# Patient Record
Sex: FEMALE | Race: WHITE | Employment: FULL TIME | URBAN - NONMETROPOLITAN AREA
[De-identification: names, ages, dates, MRNs, and addresses within clinical notes are randomized per-mention and may not be internally consistent; named-entity substitution may affect disease eponyms.]

---

## 2017-07-25 ENCOUNTER — HOSPITAL ENCOUNTER (OUTPATIENT)
Dept: GENERAL RADIOLOGY | Age: 47
Discharge: HOME | End: 2017-07-25
Payer: COMMERCIAL

## 2017-07-25 ENCOUNTER — HOSPITAL ENCOUNTER (OUTPATIENT)
Age: 47
Discharge: HOME | End: 2017-07-25
Payer: COMMERCIAL

## 2017-07-25 DIAGNOSIS — R05.9 COUGH: ICD-10-CM

## 2017-07-25 PROCEDURE — 71020 XR CHEST STANDARD TWO VW: CPT

## 2017-08-03 ENCOUNTER — HOSPITAL ENCOUNTER (OUTPATIENT)
Dept: GENERAL RADIOLOGY | Age: 47
Discharge: HOME | End: 2017-08-03
Payer: COMMERCIAL

## 2017-08-03 DIAGNOSIS — R13.10 DYSPHAGIA, UNSPECIFIED TYPE: ICD-10-CM

## 2017-08-03 PROCEDURE — 74230 X-RAY XM SWLNG FUNCJ C+: CPT

## 2017-08-03 PROCEDURE — 92611 MOTION FLUOROSCOPY/SWALLOW: CPT | Performed by: SPEECH-LANGUAGE PATHOLOGIST

## 2017-08-03 PROCEDURE — 2500000003 HC RX 250 WO HCPCS: Performed by: INTERNAL MEDICINE

## 2017-08-03 RX ADMIN — BARIUM SULFATE 60 ML: 0.81 POWDER, FOR SUSPENSION ORAL at 10:29

## 2017-08-03 RX ADMIN — BARIUM SULFATE 20 ML: 400 PASTE ORAL at 10:35

## 2018-04-13 ENCOUNTER — HOSPITAL ENCOUNTER (OUTPATIENT)
Age: 48
Discharge: HOME | End: 2018-04-13
Payer: COMMERCIAL

## 2018-04-13 LAB
BASOPHILS # BLD: 0.4 %
BASOPHILS ABSOLUTE: 0 THOU/MM3 (ref 0–0.1)
CALCIUM SERPL-MCNC: 9.7 MG/DL (ref 8.5–10.5)
EOSINOPHIL # BLD: 2.6 %
EOSINOPHILS ABSOLUTE: 0.2 THOU/MM3 (ref 0–0.4)
HCT VFR BLD CALC: 42.1 % (ref 37–47)
HEMOGLOBIN: 13.8 GM/DL (ref 12–16)
IRON: 120 UG/DL (ref 50–170)
LYMPHOCYTES # BLD: 31 %
LYMPHOCYTES ABSOLUTE: 2.2 THOU/MM3 (ref 1–4.8)
MAGNESIUM: 2.1 MG/DL (ref 1.6–2.4)
MCH RBC QN AUTO: 30.5 PG (ref 27–31)
MCHC RBC AUTO-ENTMCNC: 32.8 GM/DL (ref 33–37)
MCV RBC AUTO: 93.2 FL (ref 81–99)
MONOCYTES # BLD: 6.4 %
MONOCYTES ABSOLUTE: 0.5 THOU/MM3 (ref 0.4–1.3)
NUCLEATED RED BLOOD CELLS: 0 /100 WBC
PDW BLD-RTO: 13.8 % (ref 11.5–14.5)
PLATELET # BLD: 378 THOU/MM3 (ref 130–400)
PMV BLD AUTO: 9.8 FL (ref 7.4–10.4)
RBC # BLD: 4.52 MILL/MM3 (ref 4.2–5.4)
SEG NEUTROPHILS: 59.6 %
SEGMENTED NEUTROPHILS ABSOLUTE COUNT: 4.3 THOU/MM3 (ref 1.8–7.7)
VITAMIN B-12: 596 PG/ML (ref 211–911)
VITAMIN D 25-HYDROXY: 37 NG/ML (ref 30–100)
WBC # BLD: 7.2 THOU/MM3 (ref 4.8–10.8)

## 2018-04-13 PROCEDURE — 85025 COMPLETE CBC W/AUTO DIFF WBC: CPT

## 2018-04-13 PROCEDURE — 82310 ASSAY OF CALCIUM: CPT

## 2018-04-13 PROCEDURE — 36415 COLL VENOUS BLD VENIPUNCTURE: CPT

## 2018-04-13 PROCEDURE — 82306 VITAMIN D 25 HYDROXY: CPT

## 2018-04-13 PROCEDURE — 83540 ASSAY OF IRON: CPT

## 2018-04-13 PROCEDURE — 82607 VITAMIN B-12: CPT

## 2018-04-13 PROCEDURE — 83735 ASSAY OF MAGNESIUM: CPT

## 2018-04-19 ENCOUNTER — HOSPITAL ENCOUNTER (OUTPATIENT)
Age: 48
End: 2018-04-19

## 2018-07-02 ENCOUNTER — HOSPITAL ENCOUNTER (OUTPATIENT)
Dept: MAMMOGRAPHY | Age: 48
Discharge: HOME | End: 2018-07-02
Payer: COMMERCIAL

## 2018-07-02 DIAGNOSIS — Z12.39 BREAST SCREENING: ICD-10-CM

## 2018-07-02 PROCEDURE — 77063 BREAST TOMOSYNTHESIS BI: CPT

## 2018-11-20 ENCOUNTER — HOSPITAL ENCOUNTER (OUTPATIENT)
Age: 48
End: 2018-11-20
Payer: COMMERCIAL

## 2018-12-06 ENCOUNTER — HOSPITAL ENCOUNTER (OUTPATIENT)
Age: 48
Discharge: HOME | End: 2018-12-06
Payer: COMMERCIAL

## 2018-12-06 LAB
ALBUMIN SERPL-MCNC: 4.2 G/DL (ref 3.5–5.1)
ALP BLD-CCNC: 66 U/L (ref 38–126)
ALT SERPL-CCNC: 21 U/L (ref 11–66)
ANION GAP SERPL CALCULATED.3IONS-SCNC: 12 MEQ/L (ref 8–16)
AST SERPL-CCNC: 23 U/L (ref 5–40)
BILIRUB SERPL-MCNC: 0.6 MG/DL (ref 0.3–1.2)
BUN BLDV-MCNC: 16 MG/DL (ref 7–22)
CALCIUM SERPL-MCNC: 9.5 MG/DL (ref 8.5–10.5)
CHLORIDE BLD-SCNC: 104 MEQ/L (ref 98–111)
CO2: 29 MEQ/L (ref 23–33)
CREAT SERPL-MCNC: 0.7 MG/DL (ref 0.4–1.2)
ERYTHROCYTE [DISTWIDTH] IN BLOOD BY AUTOMATED COUNT: 12.5 % (ref 11.5–14.5)
ERYTHROCYTE [DISTWIDTH] IN BLOOD BY AUTOMATED COUNT: 43.2 FL (ref 35–45)
FOLATE: > 20 NG/ML (ref 4.8–24.2)
GFR SERPL CREATININE-BSD FRML MDRD: 88 ML/MIN/1.73M2
GLUCOSE BLD-MCNC: 87 MG/DL (ref 70–108)
HCT VFR BLD CALC: 41.2 % (ref 37–47)
HEMOGLOBIN: 13.4 GM/DL (ref 12–16)
IRON SATURATION: 63 % (ref 20–50)
IRON: 147 UG/DL (ref 50–170)
MCH RBC QN AUTO: 30.7 PG (ref 26–33)
MCHC RBC AUTO-ENTMCNC: 32.5 GM/DL (ref 32.2–35.5)
MCV RBC AUTO: 94.5 FL (ref 81–99)
PLATELET # BLD: 324 THOU/MM3 (ref 130–400)
PMV BLD AUTO: 10.6 FL (ref 9.4–12.4)
POTASSIUM SERPL-SCNC: 3.9 MEQ/L (ref 3.5–5.2)
RBC # BLD: 4.36 MILL/MM3 (ref 4.2–5.4)
SODIUM BLD-SCNC: 145 MEQ/L (ref 135–145)
TOTAL IRON BINDING CAPACITY: 233 UG/DL (ref 171–450)
TOTAL PROTEIN: 7.3 G/DL (ref 6.1–8)
TSH SERPL DL<=0.05 MIU/L-ACNC: 1.28 UIU/ML (ref 0.4–4.2)
VITAMIN B-12: 575 PG/ML (ref 211–911)
VITAMIN D 25-HYDROXY: 36 NG/ML (ref 30–100)
WBC # BLD: 4.5 THOU/MM3 (ref 4.8–10.8)

## 2018-12-06 PROCEDURE — 36415 COLL VENOUS BLD VENIPUNCTURE: CPT

## 2018-12-06 PROCEDURE — 84630 ASSAY OF ZINC: CPT

## 2018-12-06 PROCEDURE — 84446 ASSAY OF VITAMIN E: CPT

## 2018-12-06 PROCEDURE — 82306 VITAMIN D 25 HYDROXY: CPT

## 2018-12-06 PROCEDURE — 82607 VITAMIN B-12: CPT

## 2018-12-06 PROCEDURE — 83550 IRON BINDING TEST: CPT

## 2018-12-06 PROCEDURE — 83540 ASSAY OF IRON: CPT

## 2018-12-06 PROCEDURE — 85027 COMPLETE CBC AUTOMATED: CPT

## 2018-12-06 PROCEDURE — 82746 ASSAY OF FOLIC ACID SERUM: CPT

## 2018-12-06 PROCEDURE — 84443 ASSAY THYROID STIM HORMONE: CPT

## 2018-12-06 PROCEDURE — 84425 ASSAY OF VITAMIN B-1: CPT

## 2018-12-06 PROCEDURE — 84207 ASSAY OF VITAMIN B-6: CPT

## 2018-12-06 PROCEDURE — 80053 COMPREHEN METABOLIC PANEL: CPT

## 2018-12-07 LAB — FOLATE: > 20 NG/ML (ref 4.8–24.2)

## 2018-12-09 LAB — VITAMIN E LEVEL: NORMAL

## 2018-12-10 LAB
VITAMIN B6: 102.9 NMOL/L (ref 20–125)
ZINC: 69 UG/DL (ref 60–120)

## 2018-12-11 LAB — VITAMIN B1 WHOLE BLOOD: 136 NMOL/L (ref 70–180)

## 2018-12-18 ENCOUNTER — HOSPITAL ENCOUNTER (OUTPATIENT)
Age: 48
Discharge: HOME | End: 2018-12-18
Payer: COMMERCIAL

## 2018-12-18 LAB
ANION GAP SERPL CALCULATED.3IONS-SCNC: 10 MEQ/L (ref 8–16)
BUN BLDV-MCNC: 19 MG/DL (ref 7–22)
CALCIUM SERPL-MCNC: 9.7 MG/DL (ref 8.5–10.5)
CHLORIDE BLD-SCNC: 100 MEQ/L (ref 98–111)
CO2: 31 MEQ/L (ref 23–33)
CREAT SERPL-MCNC: 0.7 MG/DL (ref 0.4–1.2)
GFR SERPL CREATININE-BSD FRML MDRD: 88 ML/MIN/1.73M2
GLUCOSE BLD-MCNC: 95 MG/DL (ref 70–108)
POTASSIUM SERPL-SCNC: 5 MEQ/L (ref 3.5–5.2)
SODIUM BLD-SCNC: 141 MEQ/L (ref 135–145)

## 2018-12-18 PROCEDURE — 80048 BASIC METABOLIC PNL TOTAL CA: CPT

## 2018-12-18 PROCEDURE — 36415 COLL VENOUS BLD VENIPUNCTURE: CPT

## 2019-02-06 ENCOUNTER — HOSPITAL ENCOUNTER (OUTPATIENT)
Age: 49
Discharge: HOME | End: 2019-02-06
Payer: COMMERCIAL

## 2019-02-06 LAB
ANION GAP SERPL CALCULATED.3IONS-SCNC: 10 MEQ/L (ref 8–16)
CHLORIDE BLD-SCNC: 103 MEQ/L (ref 98–111)
CO2: 29 MEQ/L (ref 23–33)
POTASSIUM SERPL-SCNC: 4.6 MEQ/L (ref 3.5–5.2)
SODIUM BLD-SCNC: 142 MEQ/L (ref 135–145)

## 2019-02-06 PROCEDURE — 80051 ELECTROLYTE PANEL: CPT

## 2019-02-06 PROCEDURE — 82565 ASSAY OF CREATININE: CPT

## 2019-02-06 PROCEDURE — 36415 COLL VENOUS BLD VENIPUNCTURE: CPT

## 2019-02-06 PROCEDURE — 84520 ASSAY OF UREA NITROGEN: CPT

## 2019-02-07 LAB
BUN BLDV-MCNC: 18 MG/DL (ref 7–22)
CREAT SERPL-MCNC: 0.7 MG/DL (ref 0.4–1.2)
GFR SERPL CREATININE-BSD FRML MDRD: 88 ML/MIN/1.73M2

## 2019-02-12 ENCOUNTER — OFFICE VISIT (OUTPATIENT)
Dept: NEPHROLOGY | Age: 49
End: 2019-02-12
Payer: COMMERCIAL

## 2019-02-12 VITALS
HEIGHT: 66 IN | WEIGHT: 167.6 LBS | HEART RATE: 61 BPM | BODY MASS INDEX: 26.93 KG/M2 | OXYGEN SATURATION: 99 % | DIASTOLIC BLOOD PRESSURE: 69 MMHG | SYSTOLIC BLOOD PRESSURE: 110 MMHG

## 2019-02-12 DIAGNOSIS — N18.2 CKD (CHRONIC KIDNEY DISEASE), STAGE II: ICD-10-CM

## 2019-02-12 PROCEDURE — G8419 CALC BMI OUT NRM PARAM NOF/U: HCPCS | Performed by: INTERNAL MEDICINE

## 2019-02-12 PROCEDURE — G8427 DOCREV CUR MEDS BY ELIG CLIN: HCPCS | Performed by: INTERNAL MEDICINE

## 2019-02-12 PROCEDURE — 3017F COLORECTAL CA SCREEN DOC REV: CPT | Performed by: INTERNAL MEDICINE

## 2019-02-12 PROCEDURE — 99203 OFFICE O/P NEW LOW 30 MIN: CPT | Performed by: INTERNAL MEDICINE

## 2019-02-12 PROCEDURE — 4004F PT TOBACCO SCREEN RCVD TLK: CPT | Performed by: INTERNAL MEDICINE

## 2019-02-12 PROCEDURE — G8484 FLU IMMUNIZE NO ADMIN: HCPCS | Performed by: INTERNAL MEDICINE

## 2019-02-12 RX ORDER — ESCITALOPRAM OXALATE 10 MG/1
10 TABLET ORAL DAILY
COMMUNITY
End: 2020-04-21 | Stop reason: SDUPTHER

## 2019-12-04 ENCOUNTER — OFFICE VISIT (OUTPATIENT)
Dept: FAMILY MEDICINE CLINIC | Age: 49
End: 2019-12-04
Payer: COMMERCIAL

## 2019-12-04 VITALS
HEIGHT: 67 IN | HEART RATE: 64 BPM | BODY MASS INDEX: 27.62 KG/M2 | SYSTOLIC BLOOD PRESSURE: 120 MMHG | DIASTOLIC BLOOD PRESSURE: 78 MMHG | WEIGHT: 176 LBS | OXYGEN SATURATION: 97 %

## 2019-12-04 DIAGNOSIS — F41.8 DEPRESSION WITH ANXIETY: ICD-10-CM

## 2019-12-04 DIAGNOSIS — Z98.84 HX OF GASTRIC BYPASS: ICD-10-CM

## 2019-12-04 DIAGNOSIS — Z23 NEED FOR PROPHYLACTIC VACCINATION AND INOCULATION AGAINST VARICELLA: ICD-10-CM

## 2019-12-04 DIAGNOSIS — M79.12 MYALGIA OF AUXILIARY MUSCLES, HEAD AND NECK: Primary | ICD-10-CM

## 2019-12-04 PROCEDURE — 3017F COLORECTAL CA SCREEN DOC REV: CPT | Performed by: FAMILY MEDICINE

## 2019-12-04 PROCEDURE — G8484 FLU IMMUNIZE NO ADMIN: HCPCS | Performed by: FAMILY MEDICINE

## 2019-12-04 PROCEDURE — G8427 DOCREV CUR MEDS BY ELIG CLIN: HCPCS | Performed by: FAMILY MEDICINE

## 2019-12-04 PROCEDURE — 99204 OFFICE O/P NEW MOD 45 MIN: CPT | Performed by: FAMILY MEDICINE

## 2019-12-04 PROCEDURE — 1036F TOBACCO NON-USER: CPT | Performed by: FAMILY MEDICINE

## 2019-12-04 PROCEDURE — G8419 CALC BMI OUT NRM PARAM NOF/U: HCPCS | Performed by: FAMILY MEDICINE

## 2019-12-04 RX ORDER — NAPROXEN 500 MG/1
500 TABLET ORAL 2 TIMES DAILY WITH MEALS
Qty: 180 TABLET | Refills: 1 | Status: CANCELLED | OUTPATIENT
Start: 2019-12-04

## 2019-12-04 RX ORDER — CYCLOBENZAPRINE HCL 10 MG
10 TABLET ORAL 3 TIMES DAILY PRN
Qty: 30 TABLET | Refills: 2 | Status: SHIPPED | OUTPATIENT
Start: 2019-12-04 | End: 2019-12-14

## 2019-12-04 SDOH — HEALTH STABILITY: MENTAL HEALTH: HOW MANY STANDARD DRINKS CONTAINING ALCOHOL DO YOU HAVE ON A TYPICAL DAY?: 1 OR 2

## 2019-12-04 SDOH — HEALTH STABILITY: MENTAL HEALTH: HOW OFTEN DO YOU HAVE A DRINK CONTAINING ALCOHOL?: MONTHLY OR LESS

## 2019-12-04 ASSESSMENT — PATIENT HEALTH QUESTIONNAIRE - PHQ9
1. LITTLE INTEREST OR PLEASURE IN DOING THINGS: 0
SUM OF ALL RESPONSES TO PHQ QUESTIONS 1-9: 0
SUM OF ALL RESPONSES TO PHQ9 QUESTIONS 1 & 2: 0
2. FEELING DOWN, DEPRESSED OR HOPELESS: 0
SUM OF ALL RESPONSES TO PHQ QUESTIONS 1-9: 0

## 2019-12-04 ASSESSMENT — ENCOUNTER SYMPTOMS
CHEST TIGHTNESS: 0
EYE ITCHING: 0
DIARRHEA: 0
SINUS PAIN: 0
ABDOMINAL PAIN: 0
NAUSEA: 0
VOMITING: 0
EYE PAIN: 0
SORE THROAT: 0
BLOOD IN STOOL: 0
COLOR CHANGE: 0
COUGH: 0
CONSTIPATION: 0
WHEEZING: 0
SHORTNESS OF BREATH: 0

## 2019-12-19 ENCOUNTER — HOSPITAL ENCOUNTER (OUTPATIENT)
Age: 49
Discharge: HOME | End: 2019-12-19
Payer: COMMERCIAL

## 2019-12-19 DIAGNOSIS — M79.12 MYALGIA OF AUXILIARY MUSCLES, HEAD AND NECK: ICD-10-CM

## 2019-12-19 DIAGNOSIS — Z98.84 HX OF GASTRIC BYPASS: ICD-10-CM

## 2019-12-19 LAB
FOLATE: > 20 NG/ML (ref 4.8–24.2)
VITAMIN B-12: 837 PG/ML (ref 211–911)
VITAMIN D 25-HYDROXY: 35 NG/ML (ref 30–100)

## 2019-12-19 PROCEDURE — 84425 ASSAY OF VITAMIN B-1: CPT

## 2019-12-19 PROCEDURE — 82746 ASSAY OF FOLIC ACID SERUM: CPT

## 2019-12-19 PROCEDURE — 36415 COLL VENOUS BLD VENIPUNCTURE: CPT

## 2019-12-19 PROCEDURE — 84207 ASSAY OF VITAMIN B-6: CPT

## 2019-12-19 PROCEDURE — 84446 ASSAY OF VITAMIN E: CPT

## 2019-12-19 PROCEDURE — 82306 VITAMIN D 25 HYDROXY: CPT

## 2019-12-19 PROCEDURE — 84630 ASSAY OF ZINC: CPT

## 2019-12-19 PROCEDURE — 82607 VITAMIN B-12: CPT

## 2019-12-20 ENCOUNTER — PATIENT MESSAGE (OUTPATIENT)
Dept: FAMILY MEDICINE CLINIC | Age: 49
End: 2019-12-20

## 2019-12-21 LAB — ZINC: 76.1 UG/DL (ref 60–120)

## 2019-12-23 LAB
VITAMIN B6: 111.2 NMOL/L (ref 20–125)
VITAMIN E LEVEL: NORMAL

## 2019-12-24 LAB — VITAMIN B1 WHOLE BLOOD: 131 NMOL/L (ref 70–180)

## 2020-03-01 ENCOUNTER — PATIENT MESSAGE (OUTPATIENT)
Dept: FAMILY MEDICINE CLINIC | Age: 50
End: 2020-03-01

## 2020-03-02 RX ORDER — OMEPRAZOLE 40 MG/1
40 CAPSULE, DELAYED RELEASE ORAL
Qty: 90 CAPSULE | Refills: 3 | Status: SHIPPED | OUTPATIENT
Start: 2020-03-02 | End: 2021-02-11 | Stop reason: SDUPTHER

## 2020-03-02 NOTE — TELEPHONE ENCOUNTER
From: Lino Givens  To: Agustina Preston MD  Sent: 3/1/2020 10:00 AM EST  Subject: Prescription Question    I guess it would help to tell you what prescription Dr. Lissette Covington gave me LOL.     Omeprazole DR 40 MG Cap APO  qty was 30   Take 1 capsule by mouth once daily 30 minutes before a meal.    Thanks Cande Crystal

## 2020-04-21 NOTE — TELEPHONE ENCOUNTER
Pt called and needs her Escitalopram 10 mg refilled. Needs 90 day sent to [facility] In Pharmacy.   Last seen 12/4/19

## 2020-04-23 RX ORDER — ESCITALOPRAM OXALATE 10 MG/1
10 TABLET ORAL DAILY
Qty: 90 TABLET | Refills: 3 | Status: SHIPPED | OUTPATIENT
Start: 2020-04-23 | End: 2021-02-11 | Stop reason: SDUPTHER

## 2020-05-26 ENCOUNTER — TELEPHONE (OUTPATIENT)
Dept: FAMILY MEDICINE CLINIC | Age: 50
End: 2020-05-26

## 2020-05-29 ENCOUNTER — HOSPITAL ENCOUNTER (OUTPATIENT)
Age: 50
Discharge: HOME | End: 2020-05-29
Payer: COMMERCIAL

## 2020-05-29 ENCOUNTER — HOSPITAL ENCOUNTER (OUTPATIENT)
Dept: GENERAL RADIOLOGY | Age: 50
Discharge: HOME | End: 2020-05-29
Payer: COMMERCIAL

## 2020-05-29 PROCEDURE — 73080 X-RAY EXAM OF ELBOW: CPT

## 2020-08-06 LAB
CHOLESTEROL, TOTAL: 163 MG/DL
CHOLESTEROL/HDL RATIO: 2.3
GLUCOSE FASTING: 87 MG/DL
HDLC SERPL-MCNC: 71 MG/DL (ref 35–70)
LDL CHOLESTEROL CALCULATED: 84 MG/DL (ref 0–160)
NONHDLC SERPL-MCNC: ABNORMAL MG/DL
TRIGL SERPL-MCNC: 42 MG/DL
TSH SERPL DL<=0.05 MIU/L-ACNC: 1.34 UIU/ML
VLDLC SERPL CALC-MCNC: 8 MG/DL

## 2020-09-17 ENCOUNTER — PATIENT MESSAGE (OUTPATIENT)
Dept: FAMILY MEDICINE CLINIC | Age: 50
End: 2020-09-17

## 2020-09-17 NOTE — TELEPHONE ENCOUNTER
From: [facility]  To: Cosme Martinez MD  Sent: 9/17/2020 10:43 AM EDT  Subject: Prescription Question    Good Morning,    My daughter, Anna Vasquez, was supposed to have a refill for her Lexapro send to [email or web address] and I'm not sure if [address] contacted your office or not. Can you have someone look into it and if not, send [address] a script to refill for another year.      Thanks so much!,  Ernie Milton

## 2020-09-17 NOTE — TELEPHONE ENCOUNTER
Kelly Luna and informed her that we sent in script but patient needs to set up an appointment in October.

## 2021-02-11 ENCOUNTER — OFFICE VISIT (OUTPATIENT)
Dept: FAMILY MEDICINE CLINIC | Age: 51
End: 2021-02-11
Payer: COMMERCIAL

## 2021-02-11 VITALS
DIASTOLIC BLOOD PRESSURE: 72 MMHG | HEART RATE: 81 BPM | WEIGHT: 206.2 LBS | TEMPERATURE: 97 F | RESPIRATION RATE: 18 BRPM | OXYGEN SATURATION: 98 % | BODY MASS INDEX: 32.36 KG/M2 | SYSTOLIC BLOOD PRESSURE: 112 MMHG | HEIGHT: 67 IN

## 2021-02-11 DIAGNOSIS — Z12.31 OTHER SCREENING MAMMOGRAM: ICD-10-CM

## 2021-02-11 DIAGNOSIS — B35.1 ONYCHOMYCOSIS: ICD-10-CM

## 2021-02-11 DIAGNOSIS — L30.9 DERMATITIS: ICD-10-CM

## 2021-02-11 DIAGNOSIS — Z00.00 ANNUAL PHYSICAL EXAM: Primary | ICD-10-CM

## 2021-02-11 DIAGNOSIS — Z23 NEED FOR PROPHYLACTIC VACCINATION AND INOCULATION AGAINST VARICELLA: ICD-10-CM

## 2021-02-11 PROCEDURE — 99396 PREV VISIT EST AGE 40-64: CPT | Performed by: FAMILY MEDICINE

## 2021-02-11 PROCEDURE — G8484 FLU IMMUNIZE NO ADMIN: HCPCS | Performed by: FAMILY MEDICINE

## 2021-02-11 RX ORDER — CYCLOBENZAPRINE HCL 10 MG
10 TABLET ORAL 3 TIMES DAILY PRN
Qty: 90 TABLET | Refills: 3 | Status: SHIPPED | OUTPATIENT
Start: 2021-02-11 | End: 2022-03-20

## 2021-02-11 RX ORDER — CLOBETASOL PROPIONATE 0.5 MG/G
OINTMENT TOPICAL
Qty: 30 G | Refills: 0 | Status: SHIPPED | OUTPATIENT
Start: 2021-02-11 | End: 2022-01-18

## 2021-02-11 RX ORDER — OMEPRAZOLE 40 MG/1
40 CAPSULE, DELAYED RELEASE ORAL
Qty: 90 CAPSULE | Refills: 3 | Status: SHIPPED | OUTPATIENT
Start: 2021-02-11 | End: 2021-08-16

## 2021-02-11 RX ORDER — ESCITALOPRAM OXALATE 10 MG/1
10 TABLET ORAL DAILY
Qty: 90 TABLET | Refills: 3 | Status: SHIPPED | OUTPATIENT
Start: 2021-02-11 | End: 2021-12-07

## 2021-02-11 RX ORDER — CYCLOBENZAPRINE HCL 10 MG
10 TABLET ORAL 3 TIMES DAILY PRN
COMMUNITY
End: 2021-02-11 | Stop reason: SDUPTHER

## 2021-02-11 ASSESSMENT — ENCOUNTER SYMPTOMS
WHEEZING: 0
NAUSEA: 0
RHINORRHEA: 0
DIARRHEA: 0
SORE THROAT: 0
CONSTIPATION: 0
SHORTNESS OF BREATH: 0
COUGH: 0
ABDOMINAL PAIN: 0

## 2021-02-11 NOTE — PROGRESS NOTES
2021    Rhoades Members (:  1970) is a 46 y.o. female, here for a preventive medicine evaluation. Patient Active Problem List   Diagnosis    CKD (chronic kidney disease), stage II    Depression with anxiety       Review of Systems   Constitutional: Negative for chills, fatigue and fever. HENT: Negative for congestion, rhinorrhea and sore throat. Respiratory: Negative for cough, shortness of breath and wheezing. Cardiovascular: Negative for chest pain and palpitations. Gastrointestinal: Negative for abdominal pain, constipation, diarrhea and nausea. No heartburn on prilosec   Genitourinary: Negative for dysuria and hematuria. Musculoskeletal: Negative for arthralgias and myalgias. Skin: Positive for rash (dry, thickened skin of shins which occurs annuall in winter). Yellow finger nails, left hand   Neurological: Negative for dizziness and headaches. Psychiatric/Behavioral: Negative for dysphoric mood (controlled on lexapro) and sleep disturbance. The patient is not nervous/anxious. Prior to Visit Medications    Medication Sig Taking? Authorizing Provider   cyclobenzaprine (FLEXERIL) 10 MG tablet Take 1 tablet by mouth 3 times daily as needed for Muscle spasms Yes Delon Rubi MD   escitalopram (LEXAPRO) 10 MG tablet Take 1 tablet by mouth daily Yes Delon Rubi MD   omeprazole (PRILOSEC) 40 MG delayed release capsule Take 1 capsule by mouth every morning (before breakfast) Yes Delon Rubi MD   ciclopirox (PENLAC) 8 % solution Apply topically nightly. Yes Delon Rubi MD   clobetasol (TEMOVATE) 0.05 % ointment Apply topically 2 times daily. Yes Delon Rubi MD   zoster recombinant adjuvanted vaccine [facility]) 50 MCG/0.5ML SUSR injection Inject 0.5 mLs into the muscle once for 1 dose 50 MCG IM then repeat 2-6 months.  Yes Delon Rubi MD   Multiple Vitamins-Minerals (MULTIVITAMIN ADULT PO) Take by mouth Yes Historical MD Amando No Known Allergies    No past medical history on file. No past surgical history on file.     Social History     Socioeconomic History    Marital status:      Spouse name: Not on file    Number of children: Not on file    Years of education: Not on file    Highest education level: Not on file   Occupational History    Not on file   Social Needs    Financial resource strain: Not on file    Food insecurity     Worry: Not on file     Inability: Not on file    Transportation needs     Medical: Not on file     Non-medical: Not on file   Tobacco Use    Smoking status: Never Smoker    Smokeless tobacco: Never Used   Substance and Sexual Activity    Alcohol use: Yes     Frequency: Monthly or less     Drinks per session: 1 or 2     Binge frequency: Never    Drug use: Never    Sexual activity: Not on file   Lifestyle    Physical activity     Days per week: Not on file     Minutes per session: Not on file    Stress: Not on file   Relationships    Social connections     Talks on phone: Not on file     Gets together: Not on file     Attends [employer] service: Not on file     Active member of club or organization: Not on file     Attends meetings of clubs or organizations: Not on file     Relationship status: Not on file    Intimate partner violence     Fear of current or ex partner: Not on file     Emotionally abused: Not on file     Physically abused: Not on file     Forced sexual activity: Not on file   Other Topics Concern    Not on file   Social History Narrative    Not on file        Family History   Problem Relation Age of Onset    Cancer Mother 64        Colon cancer    Other Father         Overweight and alcohol abuse    Alcohol Abuse Father     Cancer Sister         Skin and thyroid cancer       ADVANCE DIRECTIVE: N, <no information>    Vitals:    02/11/21 1540   BP: 112/72   Site: Left Upper Arm   Position: Sitting   Cuff Size: Large Adult   Pulse: 81   Resp: 18   Temp: 97 °F (36.1 °C) TempSrc: Temporal   SpO2: 98%   Weight: 206 lb 3.2 oz (93.5 kg)   Height: 5' 7\" (1.702 m)     Estimated body mass index is 32.3 kg/m² as calculated from the following:    Height as of this encounter: 5' 7\" (1.702 m). Weight as of this encounter: 206 lb 3.2 oz (93.5 kg). Physical Exam  Vitals signs and nursing note reviewed. Constitutional:       General: She is not in acute distress. Appearance: She is well-developed. HENT:      Head: Normocephalic and atraumatic. Right Ear: Hearing, tympanic membrane, ear canal and external ear normal.      Left Ear: Hearing, tympanic membrane, ear canal and external ear normal.      Nose:      Right Sinus: No maxillary sinus tenderness or frontal sinus tenderness. Left Sinus: No maxillary sinus tenderness or frontal sinus tenderness. Mouth/Throat:      Pharynx: No oropharyngeal exudate or posterior oropharyngeal erythema. Eyes:      General: No scleral icterus. Right eye: No discharge. Left eye: No discharge. Conjunctiva/sclera: Conjunctivae normal.      Pupils: Pupils are equal, round, and reactive to light. Neck:      Musculoskeletal: Normal range of motion and neck supple. Cardiovascular:      Rate and Rhythm: Normal rate and regular rhythm. Heart sounds: Normal heart sounds. Pulmonary:      Effort: Pulmonary effort is normal. No respiratory distress. Breath sounds: Normal breath sounds. No wheezing. Abdominal:      General: Bowel sounds are normal. There is no distension. Palpations: Abdomen is soft. Tenderness: There is no abdominal tenderness. Musculoskeletal: Normal range of motion. General: No tenderness. Lymphadenopathy:      Cervical: No cervical adenopathy. Skin:     General: Skin is warm and dry. Findings: No rash.              Comments: onychomycoisis of left hand   Neurological: Mental Status: She is alert and oriented to person, place, and time. Mental status is at baseline. Motor: No abnormal muscle tone. Coordination: Coordination normal.   Psychiatric:         Behavior: Behavior normal.         Thought Content: Thought content normal.         Judgment: Judgment normal.         No flowsheet data found. Lab Results   Component Value Date    CHOL 163 08/06/2020    TRIG 42 08/06/2020    HDL 71 08/06/2020    LDLCALC 84 08/06/2020    GLUF 87 08/06/2020    GLUCOSE 95 12/18/2018       The 10-year ASCVD risk score (Jason Ye, et al., 2013) is: 0.8%    Values used to calculate the score:      Age: 46 years      Sex: Female      Is Non- : No      Diabetic: No      Tobacco smoker: No      Systolic Blood Pressure: 148 mmHg      Is BP treated: No      HDL Cholesterol: 71 mg/dL      Total Cholesterol: 163 mg/dL    Immunization History   Administered Date(s) Administered    Tdap (Boostrix, Adacel) 06/12/2014       Health Maintenance   Topic Date Due    Shingles Vaccine (1 of 2) 01/04/2018    Cervical cancer screen  07/15/2019    Breast cancer screen  07/02/2020    Flu vaccine (1) 02/11/2022 (Originally 9/1/2020)    DTaP/Tdap/Td vaccine (2 - Td) 06/12/2024    Lipid screen  08/06/2025    Colon cancer screen colonoscopy  03/28/2027    Hepatitis A vaccine  Aged Out    Hepatitis B vaccine  Aged Out    Hib vaccine  Aged Out    Meningococcal (ACWY) vaccine  Aged Out    Pneumococcal 0-64 years Vaccine  Aged Out    Hepatitis C screen  Discontinued    HIV screen  Discontinued       ASSESSMENT/PLAN:  1. Annual physical exam  2. Other screening mammogram  -     DENNIS DIGITAL SCREEN W OR WO CAD BILATERAL; Future  3. Onychomycosis  4. Dermatitis  -     clobetasol (TEMOVATE) 0.05 % ointment; Apply topically 2 times daily. , Disp-30 g, R-0, Normal  5.  Need for prophylactic vaccination and inoculation against varicella

## 2021-04-08 LAB
CHOLESTEROL, TOTAL: 178 MG/DL
CHOLESTEROL/HDL RATIO: 2.5
HDLC SERPL-MCNC: 70 MG/DL (ref 35–70)
LDL CHOLESTEROL CALCULATED: 95 MG/DL (ref 0–160)
NONHDLC SERPL-MCNC: NORMAL MG/DL
TRIGL SERPL-MCNC: 66 MG/DL
TSH SERPL DL<=0.05 MIU/L-ACNC: 1.31 UIU/ML
VLDLC SERPL CALC-MCNC: NORMAL MG/DL

## 2021-08-10 ENCOUNTER — HOSPITAL ENCOUNTER (OUTPATIENT)
Dept: GENERAL RADIOLOGY | Age: 51
Discharge: HOME | End: 2021-08-10
Payer: COMMERCIAL

## 2021-08-10 ENCOUNTER — PATIENT MESSAGE (OUTPATIENT)
Dept: FAMILY MEDICINE CLINIC | Age: 51
End: 2021-08-10

## 2021-08-10 ENCOUNTER — HOSPITAL ENCOUNTER (OUTPATIENT)
Age: 51
Discharge: HOME | End: 2021-08-10
Payer: COMMERCIAL

## 2021-08-10 DIAGNOSIS — M79.672 LEFT FOOT PAIN: ICD-10-CM

## 2021-08-10 DIAGNOSIS — M79.672 LEFT FOOT PAIN: Primary | ICD-10-CM

## 2021-08-10 PROCEDURE — 73630 X-RAY EXAM OF FOOT: CPT

## 2021-08-10 NOTE — TELEPHONE ENCOUNTER
From: Valentino Manson  To: Celia Harley MD  Sent: 8/10/2021 9:42 AM EDT  Subject: Non-Urgent Medical Question    I've been having an issue with my left foot and I think I need to get an X-Ray. Do I need to get a script from your office or do I just go to [facility] in [facility] on my own? I've been dealing with it for over 3 weeks with no relief. Dr. Ofe Recio & Dr. Getachew Klein (chiropractors) in [place] have both worked with me on it.     Thanks,  Edwin Anderson

## 2021-08-16 RX ORDER — OMEPRAZOLE 40 MG/1
CAPSULE, DELAYED RELEASE ORAL
Qty: 90 CAPSULE | Refills: 0 | Status: SHIPPED | OUTPATIENT
Start: 2021-08-16 | End: 2022-05-04

## 2021-09-07 ENCOUNTER — OFFICE VISIT (OUTPATIENT)
Dept: FAMILY MEDICINE CLINIC | Age: 51
End: 2021-09-07
Payer: COMMERCIAL

## 2021-09-07 VITALS
OXYGEN SATURATION: 98 % | TEMPERATURE: 98.3 F | HEIGHT: 66 IN | HEART RATE: 83 BPM | DIASTOLIC BLOOD PRESSURE: 80 MMHG | WEIGHT: 212 LBS | RESPIRATION RATE: 16 BRPM | BODY MASS INDEX: 34.07 KG/M2 | SYSTOLIC BLOOD PRESSURE: 107 MMHG

## 2021-09-07 DIAGNOSIS — M72.2 PLANTAR FASCIITIS OF LEFT FOOT: Primary | ICD-10-CM

## 2021-09-07 PROCEDURE — 99213 OFFICE O/P EST LOW 20 MIN: CPT | Performed by: NURSE PRACTITIONER

## 2021-09-07 PROCEDURE — 96372 THER/PROPH/DIAG INJ SC/IM: CPT | Performed by: NURSE PRACTITIONER

## 2021-09-07 PROCEDURE — 3017F COLORECTAL CA SCREEN DOC REV: CPT | Performed by: NURSE PRACTITIONER

## 2021-09-07 PROCEDURE — G8417 CALC BMI ABV UP PARAM F/U: HCPCS | Performed by: NURSE PRACTITIONER

## 2021-09-07 PROCEDURE — 1036F TOBACCO NON-USER: CPT | Performed by: NURSE PRACTITIONER

## 2021-09-07 PROCEDURE — G8427 DOCREV CUR MEDS BY ELIG CLIN: HCPCS | Performed by: NURSE PRACTITIONER

## 2021-09-07 RX ORDER — TRIAMCINOLONE ACETONIDE 40 MG/ML
60 INJECTION, SUSPENSION INTRA-ARTICULAR; INTRAMUSCULAR ONCE
Status: COMPLETED | OUTPATIENT
Start: 2021-09-07 | End: 2021-09-07

## 2021-09-07 RX ADMIN — TRIAMCINOLONE ACETONIDE 60 MG: 40 INJECTION, SUSPENSION INTRA-ARTICULAR; INTRAMUSCULAR at 09:50

## 2021-09-07 NOTE — PROGRESS NOTES
Rafia Mena (:  1970) is a 46 y.o. female,Established patient, here for evaluation of the following chief complaint(s): Foot Pain (plantar fasciitis- Left foot- would like to discuss getting injection )         ASSESSMENT/PLAN:  1. Plantar fasciitis of left foot  -     triamcinolone acetonide (KENALOG-40) injection 60 mg; 60 mg, IntraMUSCular, ONCE, On Tue 21 at 1000, For 1 dose    Kenalog injection  Good inserts in shoes  Minimize walking for now  Foot exercises as discussed    Return if symptoms worsen or fail to improve. Subjective   SUBJECTIVE/OBJECTIVE:  HPI    Left foot pain for last 2 months. Dx with plantar fascitis. Has been doing exercises. Pain is moderate. Xray was neg 3 weeks. Review of Systems   Constitutional: Negative for chills and fever. Musculoskeletal: Positive for arthralgias, gait problem and myalgias. Objective   Physical Exam  Constitutional:       Appearance: Normal appearance. HENT:      Head: Normocephalic and atraumatic. Cardiovascular:      Rate and Rhythm: Normal rate. Pulmonary:      Effort: Pulmonary effort is normal.   Musculoskeletal:         General: Tenderness present. No swelling, deformity or signs of injury. Comments: Bottom of left pain with rom and palpation   Skin:     General: Skin is warm and dry. Neurological:      Mental Status: She is alert. On this date 2021 I have spent 21 minutes reviewing previous notes, test results and face to face with the patient discussing the diagnosis and importance of compliance with the treatment plan as well as documenting on the day of the visit. An electronic signature was used to authenticate this note.     --Hernandez Grade, APRN - CNP

## 2021-09-07 NOTE — PROGRESS NOTES
Administrations This Visit     triamcinolone acetonide (KENALOG-40) injection 60 mg     Admin Date  09/07/2021  09:50 Action  Given Dose  60 mg Route  IntraMUSCular Site  Dorsogluteal Left Administered By  Juno Starr MA    Ordering Provider: STEHPANY Ko CNP    NDC: 6744-4078-51    Lot#: [id number]    : [employer] [place] (PRIMARY CARE)    Patient Supplied?: No                Patient instructed to remain in clinic for 20 minutes after injection and was advised to report any adverse reaction to me immediately.   Patient tolerated injection well

## 2021-11-23 ENCOUNTER — HOSPITAL ENCOUNTER (OUTPATIENT)
Dept: MAMMOGRAPHY | Age: 51
Discharge: HOME | End: 2021-11-23
Payer: COMMERCIAL

## 2021-11-23 DIAGNOSIS — Z12.31 OTHER SCREENING MAMMOGRAM: ICD-10-CM

## 2021-11-23 PROCEDURE — 77063 BREAST TOMOSYNTHESIS BI: CPT

## 2021-11-26 ENCOUNTER — HOSPITAL ENCOUNTER (OUTPATIENT)
Dept: WOMENS IMAGING | Age: 51
End: 2021-11-26
Payer: COMMERCIAL

## 2021-11-26 ENCOUNTER — HOSPITAL ENCOUNTER (OUTPATIENT)
Dept: WOMENS IMAGING | Age: 51
Discharge: HOME | End: 2021-11-26
Payer: COMMERCIAL

## 2021-11-26 DIAGNOSIS — R92.2 BREAST DENSITY: ICD-10-CM

## 2021-11-26 PROCEDURE — G0279 TOMOSYNTHESIS, MAMMO: HCPCS

## 2021-12-07 RX ORDER — ESCITALOPRAM OXALATE 10 MG/1
10 TABLET ORAL DAILY
Qty: 90 TABLET | Refills: 3 | Status: SHIPPED | OUTPATIENT
Start: 2021-12-07 | End: 2022-10-25 | Stop reason: SDUPTHER

## 2022-01-18 ENCOUNTER — PATIENT MESSAGE (OUTPATIENT)
Dept: FAMILY MEDICINE CLINIC | Age: 52
End: 2022-01-18

## 2022-01-18 ENCOUNTER — OFFICE VISIT (OUTPATIENT)
Dept: FAMILY MEDICINE CLINIC | Age: 52
End: 2022-01-18
Payer: COMMERCIAL

## 2022-01-18 VITALS
TEMPERATURE: 97.3 F | OXYGEN SATURATION: 96 % | SYSTOLIC BLOOD PRESSURE: 116 MMHG | WEIGHT: 216 LBS | DIASTOLIC BLOOD PRESSURE: 78 MMHG | RESPIRATION RATE: 18 BRPM | BODY MASS INDEX: 34.86 KG/M2 | HEART RATE: 82 BPM

## 2022-01-18 DIAGNOSIS — M26.609 TMJ (TEMPOROMANDIBULAR JOINT DISORDER): ICD-10-CM

## 2022-01-18 DIAGNOSIS — U07.1 COVID-19: Primary | ICD-10-CM

## 2022-01-18 DIAGNOSIS — U07.1 COVID: Primary | ICD-10-CM

## 2022-01-18 LAB
Lab: ABNORMAL
QC PASS/FAIL: ABNORMAL
SARS-COV-2 RDRP RESP QL NAA+PROBE: POSITIVE

## 2022-01-18 PROCEDURE — 87635 SARS-COV-2 COVID-19 AMP PRB: CPT | Performed by: NURSE PRACTITIONER

## 2022-01-18 PROCEDURE — 99213 OFFICE O/P EST LOW 20 MIN: CPT | Performed by: NURSE PRACTITIONER

## 2022-01-18 PROCEDURE — 1036F TOBACCO NON-USER: CPT | Performed by: NURSE PRACTITIONER

## 2022-01-18 PROCEDURE — G8427 DOCREV CUR MEDS BY ELIG CLIN: HCPCS | Performed by: NURSE PRACTITIONER

## 2022-01-18 PROCEDURE — G8484 FLU IMMUNIZE NO ADMIN: HCPCS | Performed by: NURSE PRACTITIONER

## 2022-01-18 PROCEDURE — G8417 CALC BMI ABV UP PARAM F/U: HCPCS | Performed by: NURSE PRACTITIONER

## 2022-01-18 PROCEDURE — 3017F COLORECTAL CA SCREEN DOC REV: CPT | Performed by: NURSE PRACTITIONER

## 2022-01-18 RX ORDER — TIZANIDINE 4 MG/1
4 TABLET ORAL 3 TIMES DAILY
Qty: 30 TABLET | Refills: 2 | Status: SHIPPED | OUTPATIENT
Start: 2022-01-18 | End: 2022-03-20

## 2022-01-18 SDOH — ECONOMIC STABILITY: TRANSPORTATION INSECURITY
IN THE PAST 12 MONTHS, HAS LACK OF TRANSPORTATION KEPT YOU FROM MEETINGS, WORK, OR FROM GETTING THINGS NEEDED FOR DAILY LIVING?: NO

## 2022-01-18 SDOH — ECONOMIC STABILITY: FOOD INSECURITY: WITHIN THE PAST 12 MONTHS, YOU WORRIED THAT YOUR FOOD WOULD RUN OUT BEFORE YOU GOT MONEY TO BUY MORE.: NEVER TRUE

## 2022-01-18 SDOH — ECONOMIC STABILITY: FOOD INSECURITY: WITHIN THE PAST 12 MONTHS, THE FOOD YOU BOUGHT JUST DIDN'T LAST AND YOU DIDN'T HAVE MONEY TO GET MORE.: NEVER TRUE

## 2022-01-18 SDOH — ECONOMIC STABILITY: TRANSPORTATION INSECURITY
IN THE PAST 12 MONTHS, HAS THE LACK OF TRANSPORTATION KEPT YOU FROM MEDICAL APPOINTMENTS OR FROM GETTING MEDICATIONS?: NO

## 2022-01-18 ASSESSMENT — SOCIAL DETERMINANTS OF HEALTH (SDOH): HOW HARD IS IT FOR YOU TO PAY FOR THE VERY BASICS LIKE FOOD, HOUSING, MEDICAL CARE, AND HEATING?: NOT HARD AT ALL

## 2022-01-18 ASSESSMENT — PATIENT HEALTH QUESTIONNAIRE - PHQ9
SUM OF ALL RESPONSES TO PHQ QUESTIONS 1-9: 0
SUM OF ALL RESPONSES TO PHQ9 QUESTIONS 1 & 2: 0
8. MOVING OR SPEAKING SO SLOWLY THAT OTHER PEOPLE COULD HAVE NOTICED. OR THE OPPOSITE, BEING SO FIGETY OR RESTLESS THAT YOU HAVE BEEN MOVING AROUND A LOT MORE THAN USUAL: 0
SUM OF ALL RESPONSES TO PHQ QUESTIONS 1-9: 0
9. THOUGHTS THAT YOU WOULD BE BETTER OFF DEAD, OR OF HURTING YOURSELF: 0
SUM OF ALL RESPONSES TO PHQ QUESTIONS 1-9: 0
3. TROUBLE FALLING OR STAYING ASLEEP: 0
10. IF YOU CHECKED OFF ANY PROBLEMS, HOW DIFFICULT HAVE THESE PROBLEMS MADE IT FOR YOU TO DO YOUR WORK, TAKE CARE OF THINGS AT HOME, OR GET ALONG WITH OTHER PEOPLE: 0
6. FEELING BAD ABOUT YOURSELF - OR THAT YOU ARE A FAILURE OR HAVE LET YOURSELF OR YOUR FAMILY DOWN: 0
1. LITTLE INTEREST OR PLEASURE IN DOING THINGS: 0
4. FEELING TIRED OR HAVING LITTLE ENERGY: 0
2. FEELING DOWN, DEPRESSED OR HOPELESS: 0
5. POOR APPETITE OR OVEREATING: 0
7. TROUBLE CONCENTRATING ON THINGS, SUCH AS READING THE NEWSPAPER OR WATCHING TELEVISION: 0
SUM OF ALL RESPONSES TO PHQ QUESTIONS 1-9: 0

## 2022-01-18 NOTE — PROGRESS NOTES
Makayla Stephens (:  1970) is a 45 y.o. female,Established patient, here for evaluation of the following chief complaint(s):  Cough (with a head cold x 1 weeks would like a zpack)         ASSESSMENT/PLAN:  1. COVID-19  -     POCT COVID-19 Rapid, NAAT  2. TMJ (temporomandibular joint disorder)  -     tiZANidine (ZANAFLEX) 4 MG tablet; Take 1 tablet by mouth 3 times daily, Disp-30 tablet, R-2Normal    zanaflex as prescribed for TMJ pain  Avoid heavy chewing foods    covid pos  Quarantine   Fluids  Rest  Tylenol      Return if symptoms worsen or fail to improve. Subjective   SUBJECTIVE/OBJECTIVE:  HPI    Cough and Congestion for 2 weeks. Lots of pnd. No fevers. No diarrhea or headaches. No loss of taste or smell. No sob. Vaccinated for covid. Getting worse since Thursday. On flexeril but causing severe drowsiness and bad breath. Takes for tmj. Does work but doesn't like side effects. Review of Systems   Constitutional: Negative for activity change, appetite change, chills, diaphoresis, fatigue, fever and unexpected weight change. HENT: Positive for congestion and postnasal drip. Negative for ear pain, sinus pressure and sore throat.         + TMJ pain left   Eyes: Negative for visual disturbance. Respiratory: Positive for cough. Negative for chest tightness, shortness of breath, wheezing and stridor. Cardiovascular: Negative for chest pain, palpitations and leg swelling. Gastrointestinal: Negative for abdominal distention, abdominal pain, constipation, diarrhea, nausea and vomiting. Endocrine: Negative for polydipsia, polyphagia and polyuria. Genitourinary: Negative for decreased urine volume, difficulty urinating, dysuria, flank pain, frequency, hematuria and urgency. Musculoskeletal: Negative for arthralgias, back pain, gait problem, joint swelling, myalgias and neck pain. Skin: Negative for color change, pallor and rash.    Neurological: Negative for dizziness, syncope, weakness, light-headedness, numbness and headaches. Hematological: Negative for adenopathy. Psychiatric/Behavioral: Negative for behavioral problems, self-injury and sleep disturbance. The patient is not nervous/anxious. Objective   Physical Exam  Constitutional:       Appearance: Normal appearance. HENT:      Head: Normocephalic and atraumatic. Right Ear: Tympanic membrane normal.      Left Ear: Tympanic membrane normal.      Nose: Congestion present. Mouth/Throat:      Mouth: Mucous membranes are moist.      Pharynx: Oropharynx is clear. No oropharyngeal exudate or posterior oropharyngeal erythema. Comments: Positive left TMJ pain and clicking with opening and closing. Cardiovascular:      Rate and Rhythm: Normal rate and regular rhythm. Heart sounds: Normal heart sounds. No murmur heard. Pulmonary:      Effort: Pulmonary effort is normal.   Musculoskeletal:         General: Normal range of motion. Cervical back: Normal range of motion and neck supple. Skin:     General: Skin is warm and dry. Neurological:      Mental Status: She is alert and oriented to person, place, and time. On this date 1/18/2022 I have spent 25 minutes reviewing previous notes, test results and face to face with the patient discussing the diagnosis and importance of compliance with the treatment plan as well as documenting on the day of the visit. An electronic signature was used to authenticate this note.     --STEPHANY Soliz - SOLANGE

## 2022-01-24 RX ORDER — DEXAMETHASONE 6 MG/1
6 TABLET ORAL
Qty: 7 TABLET | Refills: 0 | Status: SHIPPED | OUTPATIENT
Start: 2022-01-24 | End: 2022-01-24 | Stop reason: SDUPTHER

## 2022-01-24 ASSESSMENT — ENCOUNTER SYMPTOMS
STRIDOR: 0
SINUS PRESSURE: 0
COUGH: 1
SORE THROAT: 0
NAUSEA: 0
SHORTNESS OF BREATH: 0
VOMITING: 0
ABDOMINAL PAIN: 0
WHEEZING: 0
CHEST TIGHTNESS: 0
ABDOMINAL DISTENTION: 0
COLOR CHANGE: 0
CONSTIPATION: 0
BACK PAIN: 0
DIARRHEA: 0

## 2022-01-24 NOTE — TELEPHONE ENCOUNTER
From: Li Ricardo  Sent: 1/23/2022 10:20 PM EST  To: [facility] [id number] Clinical Staff  Subject: Need a prescription for a linger cold    I dont feel like Im ok to go back into work because Yovany got a tender throat, cough, and running nose. Monday (1/24) would have been when my quarantine was up. I feel I need an antibiotic for a cold or is this still from [facility]? I dont know what to do.  Thanks, Mis Tom

## 2022-03-20 ENCOUNTER — APPOINTMENT (OUTPATIENT)
Dept: GENERAL RADIOLOGY | Age: 52
DRG: 494 | End: 2022-03-20
Payer: COMMERCIAL

## 2022-03-20 ENCOUNTER — HOSPITAL ENCOUNTER (INPATIENT)
Age: 52
LOS: 3 days | Discharge: HOME | DRG: 494 | End: 2022-03-23
Attending: FAMILY MEDICINE | Admitting: SURGERY
Payer: COMMERCIAL

## 2022-03-20 ENCOUNTER — APPOINTMENT (OUTPATIENT)
Dept: CT IMAGING | Age: 52
DRG: 494 | End: 2022-03-20
Payer: COMMERCIAL
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
## 2022-03-20 DIAGNOSIS — S82.852A CLOSED DISPLACED TRIMALLEOLAR FRACTURE OF LEFT ANKLE, INITIAL ENCOUNTER: Primary | ICD-10-CM

## 2022-03-20 DIAGNOSIS — Z29.9 DVT PROPHYLAXIS: ICD-10-CM
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
## 2022-03-20 LAB
ALBUMIN SERPL-MCNC: 3.4 GM/DL (ref 3.4–5)
ALP BLD-CCNC: 72 U/L (ref 46–116)
ALT SERPL-CCNC: 22 U/L (ref 14–63)
ANION GAP: 7 MEQ/L (ref 8–16)
AST SERPL-CCNC: 21 U/L (ref 15–37)
BASOPHILS # BLD: 0.6 % (ref 0–3)
BILIRUB SERPL-MCNC: 0.4 MG/DL (ref 0.2–1)
BUN BLDV-MCNC: 13 MG/DL (ref 7–18)
CHLORIDE BLD-SCNC: 105 MEQ/L (ref 98–107)
CO2: 31 MEQ/L (ref 21–32)
CREAT SERPL-MCNC: 0.8 MG/DL (ref 0.6–1.3)
EKG ATRIAL RATE: 98 BPM
EKG P AXIS: 40 DEGREES
EKG P-R INTERVAL: 132 MS
EKG Q-T INTERVAL: 352 MS
EKG QRS DURATION: 80 MS
EKG QTC CALCULATION (BAZETT): 449 MS
EKG R AXIS: 48 DEGREES
EKG T AXIS: 25 DEGREES
EKG VENTRICULAR RATE: 98 BPM
EOSINOPHILS RELATIVE PERCENT: 2.3 % (ref 0–4)
GFR, ESTIMATED: 79 ML/MIN/1.73M2
GLUCOSE BLD-MCNC: 105 MG/DL (ref 74–106)
HCT VFR BLD CALC: 44.5 % (ref 37–47)
HEMOGLOBIN: 14.8 GM/DL (ref 12–16)
LYMPHOCYTES # BLD: 16.5 % (ref 15–47)
MCH RBC QN AUTO: 30.2 PG (ref 27–31)
MCHC RBC AUTO-ENTMCNC: 33.2 GM/DL (ref 33–37)
MCV RBC AUTO: 90.9 FL (ref 81–99)
MONOCYTES: 6.1 % (ref 0–12)
PDW BLD-RTO: 13.1 % (ref 11.5–14.5)
PLATELET # BLD: 334 THOU/MM3 (ref 130–400)
PMV BLD AUTO: 7 FL (ref 7.4–10.4)
POC CALCIUM: 8.6 MG/DL (ref 8.5–10.1)
POTASSIUM SERPL-SCNC: 4.1 MEQ/L (ref 3.5–5.1)
RBC # BLD: 4.89 MILL/MM3 (ref 4.2–5.4)
SEGS: 74.5 % (ref 43–75)
SODIUM BLD-SCNC: 143 MEQ/L (ref 136–145)
TOTAL PROTEIN: 6.9 GM/DL (ref 6.4–8.2)
WBC # BLD: 8 THOU/MM3 (ref 4.8–10.8)

## 2022-03-20 PROCEDURE — 2500000003 HC RX 250 WO HCPCS

## 2022-03-20 PROCEDURE — 96365 THER/PROPH/DIAG IV INF INIT: CPT

## 2022-03-20 PROCEDURE — 2500000003 HC RX 250 WO HCPCS: Performed by: STUDENT IN AN ORGANIZED HEALTH CARE EDUCATION/TRAINING PROGRAM

## 2022-03-20 PROCEDURE — 73610 X-RAY EXAM OF ANKLE: CPT

## 2022-03-20 PROCEDURE — 93005 ELECTROCARDIOGRAM TRACING: CPT | Performed by: STUDENT IN AN ORGANIZED HEALTH CARE EDUCATION/TRAINING PROGRAM

## 2022-03-20 PROCEDURE — 6820000001 HC L2 TRAUMA SURGERY EVALUATION: Performed by: SURGERY

## 2022-03-20 PROCEDURE — 94761 N-INVAS EAR/PLS OXIMETRY MLT: CPT

## 2022-03-20 PROCEDURE — APPSS180 APP SPLIT SHARED TIME > 60 MINUTES: Performed by: NURSE PRACTITIONER

## 2022-03-20 PROCEDURE — 1200000000 HC SEMI PRIVATE

## 2022-03-20 PROCEDURE — 6360000002 HC RX W HCPCS: Performed by: NURSE PRACTITIONER

## 2022-03-20 PROCEDURE — 93010 ELECTROCARDIOGRAM REPORT: CPT | Performed by: INTERNAL MEDICINE

## 2022-03-20 PROCEDURE — 71045 X-RAY EXAM CHEST 1 VIEW: CPT

## 2022-03-20 PROCEDURE — 6360000002 HC RX W HCPCS: Performed by: FAMILY MEDICINE

## 2022-03-20 PROCEDURE — 73600 X-RAY EXAM OF ANKLE: CPT

## 2022-03-20 PROCEDURE — 73700 CT LOWER EXTREMITY W/O DYE: CPT

## 2022-03-20 PROCEDURE — 85025 COMPLETE CBC W/AUTO DIFF WBC: CPT

## 2022-03-20 PROCEDURE — 6370000000 HC RX 637 (ALT 250 FOR IP): Performed by: NURSE PRACTITIONER

## 2022-03-20 PROCEDURE — 96375 TX/PRO/DX INJ NEW DRUG ADDON: CPT

## 2022-03-20 PROCEDURE — 6370000000 HC RX 637 (ALT 250 FOR IP): Performed by: FAMILY MEDICINE

## 2022-03-20 PROCEDURE — 99285 EMERGENCY DEPT VISIT HI MDM: CPT

## 2022-03-20 PROCEDURE — 2700000000 HC OXYGEN THERAPY PER DAY

## 2022-03-20 PROCEDURE — 6360000002 HC RX W HCPCS: Performed by: STUDENT IN AN ORGANIZED HEALTH CARE EDUCATION/TRAINING PROGRAM

## 2022-03-20 PROCEDURE — 27818 TREATMENT OF ANKLE FRACTURE: CPT

## 2022-03-20 PROCEDURE — 80053 COMPREHEN METABOLIC PANEL: CPT

## 2022-03-20 PROCEDURE — 2W3RX1Z IMMOBILIZATION OF LEFT LOWER LEG USING SPLINT: ICD-10-PCS | Performed by: PODIATRIST

## 2022-03-20 PROCEDURE — 2580000003 HC RX 258: Performed by: NURSE PRACTITIONER

## 2022-03-20 RX ORDER — SODIUM PHOSPHATE, DIBASIC AND SODIUM PHOSPHATE, MONOBASIC 7; 19 G/133ML; G/133ML
1 ENEMA RECTAL DAILY PRN
Status: DISCONTINUED | OUTPATIENT
Start: 2022-03-20 | End: 2022-03-23 | Stop reason: HOSPADM

## 2022-03-20 RX ORDER — SODIUM CHLORIDE 9 MG/ML
25 INJECTION, SOLUTION INTRAVENOUS PRN
Status: DISCONTINUED | OUTPATIENT
Start: 2022-03-20 | End: 2022-03-21 | Stop reason: SDUPTHER

## 2022-03-20 RX ORDER — SODIUM CHLORIDE 0.9 % (FLUSH) 0.9 %
5-40 SYRINGE (ML) INJECTION PRN
Status: DISCONTINUED | OUTPATIENT
Start: 2022-03-20 | End: 2022-03-21 | Stop reason: SDUPTHER

## 2022-03-20 RX ORDER — HYDROCODONE BITARTRATE AND ACETAMINOPHEN 5; 325 MG/1; MG/1
1 TABLET ORAL EVERY 4 HOURS PRN
Status: DISCONTINUED | OUTPATIENT
Start: 2022-03-20 | End: 2022-03-23 | Stop reason: HOSPADM

## 2022-03-20 RX ORDER — ONDANSETRON 2 MG/ML
4 INJECTION INTRAMUSCULAR; INTRAVENOUS ONCE
Status: COMPLETED | OUTPATIENT
Start: 2022-03-20 | End: 2022-03-20

## 2022-03-20 RX ORDER — PROPOFOL 10 MG/ML
INJECTION, EMULSION INTRAVENOUS CONTINUOUS PRN
Status: COMPLETED | OUTPATIENT
Start: 2022-03-20 | End: 2022-03-20

## 2022-03-20 RX ORDER — MORPHINE SULFATE 4 MG/ML
4 INJECTION, SOLUTION INTRAMUSCULAR; INTRAVENOUS
Status: DISCONTINUED | OUTPATIENT
Start: 2022-03-20 | End: 2022-03-23 | Stop reason: HOSPADM

## 2022-03-20 RX ORDER — ACETAMINOPHEN 325 MG/1
650 TABLET ORAL EVERY 4 HOURS PRN
Status: DISCONTINUED | OUTPATIENT
Start: 2022-03-20 | End: 2022-03-23 | Stop reason: HOSPADM

## 2022-03-20 RX ORDER — PROPOFOL 10 MG/ML
INJECTION, EMULSION INTRAVENOUS
Status: DISPENSED
Start: 2022-03-20 | End: 2022-03-20

## 2022-03-20 RX ORDER — KETAMINE HCL IN NACL, ISO-OSM 100MG/10ML
SYRINGE (ML) INJECTION DAILY PRN
Status: COMPLETED | OUTPATIENT
Start: 2022-03-20 | End: 2022-03-20

## 2022-03-20 RX ORDER — KETAMINE HCL IN NACL, ISO-OSM 100MG/10ML
SYRINGE (ML) INJECTION
Status: DISCONTINUED
Start: 2022-03-20 | End: 2022-03-20 | Stop reason: WASHOUT

## 2022-03-20 RX ORDER — RIVAROXABAN 10 MG/1
10 TABLET, FILM COATED ORAL
Qty: 30 TABLET | Refills: 0 | Status: SHIPPED | OUTPATIENT
Start: 2022-03-20 | End: 2022-03-20 | Stop reason: ALTCHOICE

## 2022-03-20 RX ORDER — HYDROCODONE BITARTRATE AND ACETAMINOPHEN 5; 325 MG/1; MG/1
2 TABLET ORAL EVERY 4 HOURS PRN
Status: DISCONTINUED | OUTPATIENT
Start: 2022-03-20 | End: 2022-03-23 | Stop reason: HOSPADM

## 2022-03-20 RX ORDER — PROPOFOL 10 MG/ML
INJECTION, EMULSION INTRAVENOUS
Status: DISPENSED
Start: 2022-03-20 | End: 2022-03-21

## 2022-03-20 RX ORDER — MORPHINE SULFATE 4 MG/ML
4 INJECTION, SOLUTION INTRAMUSCULAR; INTRAVENOUS ONCE
Status: COMPLETED | OUTPATIENT
Start: 2022-03-20 | End: 2022-03-20

## 2022-03-20 RX ORDER — KETAMINE HCL IN NACL, ISO-OSM 100MG/10ML
SYRINGE (ML) INJECTION
Status: DISPENSED
Start: 2022-03-20 | End: 2022-03-20

## 2022-03-20 RX ORDER — SODIUM CHLORIDE 0.9 % (FLUSH) 0.9 %
5-40 SYRINGE (ML) INJECTION EVERY 12 HOURS SCHEDULED
Status: DISCONTINUED | OUTPATIENT
Start: 2022-03-20 | End: 2022-03-21 | Stop reason: SDUPTHER

## 2022-03-20 RX ORDER — POLYETHYLENE GLYCOL 3350 17 G/17G
17 POWDER, FOR SOLUTION ORAL DAILY
Status: DISCONTINUED | OUTPATIENT
Start: 2022-03-20 | End: 2022-03-23 | Stop reason: HOSPADM

## 2022-03-20 RX ORDER — HYDROCODONE BITARTRATE AND ACETAMINOPHEN 5; 325 MG/1; MG/1
1 TABLET ORAL EVERY 6 HOURS PRN
Qty: 15 TABLET | Refills: 0 | Status: CANCELLED | OUTPATIENT
Start: 2022-03-20 | End: 2022-03-23

## 2022-03-20 RX ORDER — ONDANSETRON 4 MG/1
4 TABLET, ORALLY DISINTEGRATING ORAL EVERY 8 HOURS PRN
Status: DISCONTINUED | OUTPATIENT
Start: 2022-03-20 | End: 2022-03-23 | Stop reason: HOSPADM

## 2022-03-20 RX ORDER — HYDROCODONE BITARTRATE AND ACETAMINOPHEN 5; 325 MG/1; MG/1
1 TABLET ORAL ONCE
Status: COMPLETED | OUTPATIENT
Start: 2022-03-20 | End: 2022-03-20

## 2022-03-20 RX ORDER — LIDOCAINE HYDROCHLORIDE AND EPINEPHRINE 10; 10 MG/ML; UG/ML
INJECTION, SOLUTION INFILTRATION; PERINEURAL
Status: COMPLETED
Start: 2022-03-20 | End: 2022-03-20

## 2022-03-20 RX ORDER — FAMOTIDINE 20 MG/1
20 TABLET, FILM COATED ORAL 2 TIMES DAILY
Status: DISCONTINUED | OUTPATIENT
Start: 2022-03-20 | End: 2022-03-23

## 2022-03-20 RX ORDER — ONDANSETRON 2 MG/ML
4 INJECTION INTRAMUSCULAR; INTRAVENOUS EVERY 6 HOURS PRN
Status: DISCONTINUED | OUTPATIENT
Start: 2022-03-20 | End: 2022-03-23 | Stop reason: HOSPADM

## 2022-03-20 RX ORDER — MORPHINE SULFATE 2 MG/ML
2 INJECTION, SOLUTION INTRAMUSCULAR; INTRAVENOUS
Status: DISCONTINUED | OUTPATIENT
Start: 2022-03-20 | End: 2022-03-23 | Stop reason: HOSPADM

## 2022-03-20 RX ORDER — SODIUM CHLORIDE 9 MG/ML
INJECTION, SOLUTION INTRAVENOUS CONTINUOUS
Status: DISCONTINUED | OUTPATIENT
Start: 2022-03-21 | End: 2022-03-21 | Stop reason: DRUGHIGH

## 2022-03-20 RX ADMIN — HYDROCODONE BITARTRATE AND ACETAMINOPHEN 1 TABLET: 5; 325 TABLET ORAL at 14:15

## 2022-03-20 RX ADMIN — LIDOCAINE HYDROCHLORIDE AND EPINEPHRINE: 10; 10 INJECTION, SOLUTION INFILTRATION; PERINEURAL at 10:57

## 2022-03-20 RX ADMIN — MORPHINE SULFATE 2 MG: 2 INJECTION, SOLUTION INTRAMUSCULAR; INTRAVENOUS at 16:03

## 2022-03-20 RX ADMIN — HYDROCODONE BITARTRATE AND ACETAMINOPHEN 2 TABLET: 5; 325 TABLET ORAL at 18:20

## 2022-03-20 RX ADMIN — HYDROCODONE BITARTRATE AND ACETAMINOPHEN 1 TABLET: 5; 325 TABLET ORAL at 08:45

## 2022-03-20 RX ADMIN — SODIUM CHLORIDE: 9 INJECTION, SOLUTION INTRAVENOUS at 15:23

## 2022-03-20 RX ADMIN — MORPHINE SULFATE 4 MG: 4 INJECTION, SOLUTION INTRAMUSCULAR; INTRAVENOUS at 09:50

## 2022-03-20 RX ADMIN — FAMOTIDINE 20 MG: 20 TABLET ORAL at 14:15

## 2022-03-20 RX ADMIN — Medication 50 MG: at 11:02

## 2022-03-20 RX ADMIN — PROPOFOL 10 MG: 10 INJECTION, EMULSION INTRAVENOUS at 11:03

## 2022-03-20 RX ADMIN — ONDANSETRON 4 MG: 2 INJECTION INTRAMUSCULAR; INTRAVENOUS at 09:50

## 2022-03-20 RX ADMIN — FAMOTIDINE 20 MG: 20 TABLET ORAL at 20:04

## 2022-03-20 ASSESSMENT — ENCOUNTER SYMPTOMS
SHORTNESS OF BREATH: 0
BACK PAIN: 0
VOICE CHANGE: 0
WHEEZING: 0
TROUBLE SWALLOWING: 0
APNEA: 0
RHINORRHEA: 0
STRIDOR: 0
BLOOD IN STOOL: 0
EYE REDNESS: 0
PHOTOPHOBIA: 0
FACIAL SWELLING: 0
DIARRHEA: 0
COLOR CHANGE: 0
NAUSEA: 0
VOMITING: 0
SINUS PRESSURE: 0
CHEST TIGHTNESS: 0
ABDOMINAL PAIN: 0
ABDOMINAL DISTENTION: 0
SORE THROAT: 0
COUGH: 0
EYE PAIN: 0
CHOKING: 0
CONSTIPATION: 0
SINUS PAIN: 0
EYE DISCHARGE: 0
EYE ITCHING: 0

## 2022-03-20 ASSESSMENT — PAIN SCALES - GENERAL
PAINLEVEL_OUTOF10: 6
PAINLEVEL_OUTOF10: 8
PAINLEVEL_OUTOF10: 5
PAINLEVEL_OUTOF10: 0
PAINLEVEL_OUTOF10: 4
PAINLEVEL_OUTOF10: 8
PAINLEVEL_OUTOF10: 6
PAINLEVEL_OUTOF10: 3
PAINLEVEL_OUTOF10: 8
PAINLEVEL_OUTOF10: 0
PAINLEVEL_OUTOF10: 2

## 2022-03-20 ASSESSMENT — PAIN DESCRIPTION - PAIN TYPE
TYPE: ACUTE PAIN

## 2022-03-20 ASSESSMENT — PAIN DESCRIPTION - DESCRIPTORS
DESCRIPTORS: ACHING

## 2022-03-20 ASSESSMENT — PAIN - FUNCTIONAL ASSESSMENT: PAIN_FUNCTIONAL_ASSESSMENT: 0-10

## 2022-03-20 ASSESSMENT — PAIN DESCRIPTION - ORIENTATION
ORIENTATION: LEFT

## 2022-03-20 ASSESSMENT — PAIN DESCRIPTION - LOCATION
LOCATION: ANKLE

## 2022-03-20 NOTE — ED NOTES
Pt transported to [facility]. Floor contacted before transport.       Marcel Desai  03/20/22 [id number]

## 2022-03-20 NOTE — H&P
Department of Podiatric Surgery  History and Physical        CHIEF COMPLAINT: Left ankle trauma    Reason for Admission: Left dislocated trimalleolar ankle fracture, PER4    History Obtained From:  patient, spouse, friend    HISTORY OF PRESENT ILLNESS:      The patient is a 45 y.o. female with significant past medical history of depression who presents to [facility] ED after missing a step coming down the stairs. She felt her foot go underneath her and when she sat up she checked to make sure that her bone was not sticking out of her leg. Patient relates that pain rates at 9-10 out of 10 in severity. Denies any numbness or tingling. Patient is able to move toes. She was brought here by EMS from [facility] ambulatory care. She denies any fever, chills, nausea, vomiting, chest pain or shortness of breath. Past Medical History:        Diagnosis Date    Depression      Past Surgical History:        Procedure Laterality Date    GASTRIC BAND      TONSILLECTOMY      TUBAL LIGATION       Immunizations:              Tetanus:  unknown              Medications Prior to Admission:   Not in a hospital admission. Allergies:  Patient has no known allergies. Social History:   TOBACCO:   reports that she has never smoked. She has never used smokeless tobacco.  ETOH:   reports current alcohol use.   Family History:       Problem Relation Age of Onset    Cancer Mother 64        Colon cancer    Other Father         Overweight and alcohol abuse    Alcohol Abuse Father     Cancer Sister         Skin and thyroid cancer     REVIEW OF SYSTEMS:  CONSTITUTIONAL:  negative    PHYSICAL EXAM:  VITALS:  BP (!) 151/74   Pulse 99   Temp 97.2 °F (36.2 °C) (Temporal)   Resp 19   Ht 5' 6\" (1.676 m)   Wt 220 lb (99.8 kg)   SpO2 97%   BMI 35.51 kg/m²   CONSTITUTIONAL:  awake, alert, cooperative, no apparent distress, and appears stated age  EYES:  pupils equal, round and reactive to light, extra ocular muscles intact, sclera clear,   ENT:  normocepalic, without obvious abnormality  NECK:  Supple, symmetrical, trachea midline,skin normal    LOWER EXTREMITY:  Physical Exam:   Vascular: Dorsalis pedis and posterior tibial pulses are palpable bilaterally. Strong biphasic dopplerable pulses to the left PT and DP arteries. Skin temperature is warm to cool from proximal tibial tuberosity to distal digits. CFT brisk to exposed digits. Edema present to the left greater than the right. Hair growth present but shaved. Quality of skin within normal limits to the right, some pallor noted to the anterior ankle of the left    Dermatologic: Bilateral lower extremity soft tissue envelopes are grossly normotrophic with exception to the anterior soft tissue envelope of the left ankle. Anterior medially there is pallor noted. No ecchymosis. No fracture blisters or hemorrhagic blisters of note. Some rubor is noted to the left foot. No open wounds. Neurovascular: Epicritic and protopathic sensation grossly intact to the left lower extremity. Musculoskeletal: Muscle strength testing deferred at this time. Obvious gross deformity noted to the left ankle with the foot externally rotated and posteriorly subluxed on the leg. Skin tenting anterior medially. Pain on palpation to the left lower extremity. No pain on palpation to distal foot. IMAGING:  XR ANKLE LEFT (2 VIEWS)    Result Date: 3/20/2022  PROCEDURE: XR ANKLE LEFT (2 VIEWS) CLINICAL INFORMATION: reduction COMPARISON: Earlier study same date, 0834 hours. TECHNIQUE: AP and lateral views of the left ankle were obtained through a fiberglass cast. FINDINGS: The previously described fracture dislocation of the ankle has been reduced. The talus has been successfully repositioned in a near normal relationship of the distal tibia. The medial malleolus fracture fragment remains mildly displaced laterally but improved from earlier.  The previously described fracture fragment along the lateral/posterior aspect of the distal tibia is also more closely aligned with the distal tibia. Again noted is an oblique fracture of the distal fibular shaft. The  distal fragment is mildly displaced laterally about 4 mm. The previously noted angulation has been eliminated. Status post reduction of the previously described fracture dislocation of the ankle. **This report has been created using voice recognition software. It may contain minor errors which are inherent in voice recognition technology. ** Final report electronically signed by Dr. Joanie Mcmanus on 3/20/2022 11:41 AM    XR ANKLE LEFT (2 VIEWS)    Result Date: 3/20/2022  PROCEDURE: XR ANKLE LEFT (2 VIEWS) CLINICAL INFORMATION: FALL / DEFORMITY / SWELLING / HEARD AND FELT A POP IN HER ANKLE COMPARISON: No prior study. TECHNIQUE: AP and lateral views of the left ankle were obtained. FINDINGS: There is dislocation of the talus laterally and posteriorly and a transverse fracture through the base of the medial malleolus. The medial malleolar fragment is displaced laterally along with the talus, approximately 2 cm. There is also a fracture along the lateral aspect of the distal tibia. There is also fragmented ascites also displaced laterally with the talus. In addition, there is a moderately angulated mildly oblique fracture of the distal fibular shaft with a concavity directed laterally and posteriorly. . The distal fragment is displaced anteriorly and proximally about 1 shaft width. Note that the lateral malleolus is normal in related to the dislocated talus. Fracture dislocation of the ankle involving the medial malleolus, the distal tibia laterally, and the distal fibular shaft, as described above. **This report has been created using voice recognition software. It may contain minor errors which are inherent in voice recognition technology. ** Final report electronically signed by Dr. Joanie Mcmanus on 3/20/2022 8:57 AM    Pending CT, chest x-ray, EKG    LABS:  CBC with Differential:    Lab Results   Component Value Date    WBC 8.0 03/20/2022    RBC 4.89 03/20/2022    HGB 14.8 03/20/2022    HCT 44.5 03/20/2022     03/20/2022    MCV 90.9 03/20/2022    MCH 30.2 03/20/2022    MCHC 33.2 03/20/2022    RDW 13.1 03/20/2022    NRBC 0 04/13/2018    SEGSPCT 59.6 04/13/2018    MONOPCT 6.4 04/13/2018    MONOSABS 0.5 04/13/2018    LYMPHSABS 2.2 04/13/2018    EOSABS 0.2 04/13/2018    BASOSABS 0.0 04/13/2018     BMP:    Lab Results   Component Value Date     03/20/2022    K 4.1 03/20/2022     03/20/2022    CO2 31 03/20/2022    BUN 13 03/20/2022    LABALBU 3.4 03/20/2022    CREATININE 0.8 03/20/2022    CALCIUM 9.7 12/18/2018    LABGLOM 88 02/07/2019    GLUCOSE 105 03/20/2022        ASSESSMENT AND PLAN:  1. Left dislocated trimalleolar ankle fracture, pronation external rotation type IV    -Patient was initially examined and evaluated  -Labs within normal limits  -Obtained verify informed consent for conscious sedation closed reduction with hematoma block.  -Hematoma block was performed using 10 cc of 1% lidocaine with epinephrine, patient tolerated well  -Conscious sedation was performed and closed reduction was obtained. Adequate padding with a multilayer compression dressing with sugar tong and posterior splints applied.    -Post reduction x-rays obtained, impression above  -Ordered CT for surgical planning  -EKG and chest x-ray for PAT  -Would appreciate preoperative or stratification from primary team versus hospitalist  -Discussed case with patient and , given the nature of this injury surgical intervention is indicated. Patient does not feel safe going home as her  has physical disability and will not be able to help her get around. She is in need to stay in the hospital or go to an ECF for treatment.  -Patient is to be admitted to the trauma service prior to definitive surgical fixation.   -Given patient's soft tissue envelope compromise, we will delay surgery today and reevaluate soft tissue and swelling tomorrow. We will try to tentatively add surgery on for this week. -Nonweightbearing to left lower extremity  -Lovenox for DVT prophylaxis now, trip cast score 7, patient is high risk for developing a deep venous thrombosis. -Patient will need to be n.p.o., anticoagulation held, and verify informed consent for ORIF of left trimalleolar ankle fracture pending surgical time.  -If patient soft tissue envelope is not amenable to surgery this week will consider discharging to a skilled nursing facility until definitive surgery can take place.  -Podiatry to continue to follow while in-house    DISPO: Pending ORIF of left trimalleolar ankle fracture    Thanks for the opportunity to participate in this patient's care. Fabian Eagle DPM     Addendum: Upon review of CT it was found that the foot was still mall reduced. Foot was then rereduced as the hematoma block provided adequate anesthesia to the patient. Final reduction pictures were taken and found to be significantly improved.

## 2022-03-20 NOTE — ED NOTES
ED to inpatient nurses report    Chief Complaint   Patient presents with    Fall    Ankle Pain    Ankle Injury      Present to ED from home  LOC: alert and orientated to name, place, date  Vital signs   Vitals:    03/20/22 1106 03/20/22 1106 03/20/22 1109 03/20/22 1115   BP:   (!) 158/91 (!) 151/74   Pulse:  97 103 99   Resp:  18 22 19   Temp:       TempSrc:       SpO2: 96% 95% 100% 97%   Weight:       Height:          Oxygen Baseline rooma ir    Current needs required none Bipap/Cpap No  LDAs:   Peripheral IV 03/20/22 Right Antecubital (Active)   Site Assessment Clean;Dry; Intact 03/20/22 0958   Dressing Status Clean;Dry; Intact 03/20/22 0958       Peripheral IV 03/20/22 Right Antecubital (Active)   Site Assessment Clean;Dry; Intact 03/20/22 1101   Line Status Normal saline locked 03/20/22 1101   Dressing Status Clean;Dry; Intact 03/20/22 1101     Mobility: Requires assistance * 1  Pending ED orders: none  Present condition: stable L foot in splint      Electronically signed by Uzair Urias RN on 3/20/2022 at 11:44 AM     Marry Urias RN  03/20/22 2968

## 2022-03-20 NOTE — H&P
I have independently performed an evaluation on David . I have reviewed the above documentation completed by the [facility]. Please see my additional contributions to the HPI, physical exam, assessment/medical decision making. Greater than 50 percent of care for the trauma services was performed by me, any changes or additions to the note can be seen in [facility]. 20 minutes were spent treating and caring for the patient.      Electronically signed by Juan Jose Drew MD on 3/21/2022 at 2:05 AM    Trauma H&P  Dr. Ginny Mckenna     Patient:  Modesto Matute date: 3/20/2022   YOB: 1970 Date of Evaluation: 3/20/2022  MRN: [id number]  Acct: [de-identified]    Injury Date:3/20/2022  Injury time:PTA  PCP: Issa Escobar MD   Referring physician: ER provider    Time of Trauma Surgeon Notification:  [phone]  Time of Trama JENNI Arrival: 1200  Time of Trauma Surgeon Arrival:      Assessment:    Trauma by fall  Left dislocated trimalleolar ankle fracture  Plan:    Admit to [address] [place] [address] under the Trauma Service    Trauma by fall   - Fall precautions   - PT, OT eval and treat    Left dislocated trimalleolar ankle fracture   - Consult to Podiatry - planning on ORIF at some point during hospitalization    - Reduction performed in the Emergency Department and a splint was placed   - NV checks    - NWB to the LLE   - Pain control    Pain control   - Norco, Morphine PRN    General diet  IVF hydration for when NPO  Repeat labs in AM  PT, OT eval and treat  Prophylaxis: SCDs, IS, C&DB, Pepcid, stool softeners, Lovenox   Discharge disposition pending clinical course    Activation: []Level I (Trauma Alert) []Level II (Injury Call) [x]Level III (Trauma Consult) []Downgraded  Mode of Arrival: EMS transportation  Referring Facility: [facility]  Loss of Consciousness [x]No []Yes[]Unknown  Duration(min)  Mechanism of Injury:  []Motor Vehicle crash   []Single Vehicle [] []Passenger []Scene Fatality []Front Seat  []Restrained   []Air Bag Deployed   []Ejected []Rollover []Pedestrian []Trapped   Type of vehicle:   Protective Devices:   []Motorcycle  Wearing Helmet []Yes []No  []Bicycle  Wearing Helmet []Yes []No  [x]Fall   Distance - 1 step    []Assault    Abuse Reported []Yes []No  []Gunshot  []Stabbing  []Work Related  []Burn: []Flame []Scald []Electrical []Chemical []Contact []Inhalation []House Fire  []Other:   Patient Active Problem List   Diagnosis    CKD (chronic kidney disease), stage II    Depression with anxiety    Fall at home, initial encounter    Closed trimalleolar fracture of left ankle     Subjective   Chief Complaint: Fall    History of Present Illness:  [facility] 45year [facility] presenting to the Emergency Department via EMS from the [facility] for evaluation of injury after sustaining a fall down 1 step at home. She reports that she was carrying her 7 month old grandchild down the steps this morning and she mis-stepped and fell down one step, rolling her left ankle. She immediately knew that she had broken her left ankle. She was evaluated at the [facility] where she was diagnosed with a displaced trimalleolar fracture. She was transferred to [address] [place] [address] for podiatry and trauma evaluation. The ankle was reduced and a splint was placed. She is being admitted for surgical fixation. Review of Systems:   Review of Systems   Constitutional: Negative for activity change, appetite change, chills, diaphoresis, fatigue, fever and unexpected weight change. HENT: Negative for congestion, dental problem, drooling, ear discharge, ear pain, facial swelling, hearing loss, mouth sores, nosebleeds, postnasal drip, rhinorrhea, sinus pressure, sneezing, sore throat, tinnitus, trouble swallowing and voice change. Eyes: Negative for photophobia, pain, discharge, redness, itching and visual disturbance.    Respiratory: Negative for apnea, cough, choking, chest tightness, shortness of breath, wheezing and stridor. Cardiovascular: Negative for chest pain, palpitations and leg swelling. Gastrointestinal: Negative for abdominal distention, abdominal pain, blood in stool, constipation, diarrhea, nausea and vomiting. Endocrine: Negative for cold intolerance, heat intolerance, polydipsia, polyphagia and polyuria. Genitourinary: Negative for difficulty urinating, dysuria, flank pain, frequency, hematuria and urgency. Musculoskeletal: Positive for arthralgias and joint swelling. Negative for back pain, gait problem, myalgias, neck pain and neck stiffness. Skin: Negative for color change, pallor, rash and wound. Allergic/Immunologic: Negative for environmental allergies, food allergies and immunocompromised state. Neurological: Negative for dizziness, tremors, seizures, syncope, facial asymmetry, speech difficulty, weakness, light-headedness, numbness and headaches. Hematological: Negative for adenopathy. Does not bruise/bleed easily. Psychiatric/Behavioral: Negative for agitation, behavioral problems, confusion, decreased concentration, dysphoric mood, hallucinations, self-injury, sleep disturbance and suicidal ideas. The patient is not nervous/anxious and is not hyperactive. Patient has no known allergies.   Past Surgical History:   Procedure Laterality Date    GASTRIC BAND      TONSILLECTOMY      TUBAL LIGATION       Past Medical History:   Diagnosis Date    Depression      Past Surgical History:   Procedure Laterality Date    GASTRIC BAND      TONSILLECTOMY      TUBAL LIGATION       Social History     Socioeconomic History    Marital status:      Spouse name: None    Number of children: None    Years of education: None    Highest education level: None   Occupational History    None   Tobacco Use    Smoking status: Never Smoker    Smokeless tobacco: Never Used   Substance and Sexual Activity    Alcohol use: Yes    Drug use: Never    Sexual activity: None   Other Topics Concern    None Social History Narrative    None     Social Determinants of Health     Financial Resource Strain: Low Risk     Difficulty of Paying Living Expenses: Not hard at all   Food Insecurity: No Food Insecurity    Worried About [facility] in the Last Year: Never true    Ran Out of Food in the Last Year: Never true   Transportation Needs: No Transportation Needs    Lack of Transportation (Medical): No    Lack of Transportation (Non-Medical):  No   Physical Activity:     Days of Exercise per Week: Not on file    Minutes of Exercise per Session: Not on file   Stress:     Feeling of Stress : Not on file   Social Connections:     Frequency of Communication with Friends and Family: Not on file    Frequency of Social Gatherings with Friends and Family: Not on file    Attends [employer] Services: Not on file    Active Member of [address] [place] [address] or Organizations: Not on file    Attends Club or Organization Meetings: Not on file    Marital Status: Not on file   Intimate Partner Violence:     Fear of Current or Ex-Partner: Not on file    Emotionally Abused: Not on file    Physically Abused: Not on file    Sexually Abused: Not on file   Housing Stability:     Unable to Pay for Housing in the Last Year: Not on file    Number of [place] in the Last Year: Not on file    Unstable Housing in the Last Year: Not on file     Family History   Problem Relation Age of Onset    Cancer Mother 64        Colon cancer    Other Father         Overweight and alcohol abuse    Alcohol Abuse Father     Cancer Sister         Skin and thyroid cancer       Home medications:    Current Discharge Medication List        CONTINUE these medications which have NOT CHANGED    Details   escitalopram (LEXAPRO) 10 MG tablet TAKE 1 TABLET BY MOUTH DAILY  Qty: 90 tablet, Refills: 3      omeprazole (PRILOSEC) 40 MG delayed release capsule TAKE 1 CAPSULE BY MOUTH ONCE DAILY IN THE MORNING BEFORE BREAKFAST  Qty: 90 capsule, Refills: 0      Multiple Vitamins-Minerals (MULTIVITAMIN ADULT PO) Take by mouth             Hospital medications:  Scheduled Meds:   propofol        ketamine        sodium chloride flush  5-40 mL IntraVENous 2 times per day    polyethylene glycol  17 g Oral Daily    famotidine  20 mg Oral BID    propofol         Continuous Infusions:   sodium chloride      [START ON 3/21/2022] sodium chloride 125 mL/hr at 03/20/22 1523     PRN Meds:  Objective   ED TRIAGE VITALS  BP: 123/78, Temp: 98.2 °F (36.8 °C), Pulse: 90, Resp: 16, SpO2: 96 %  Bend Coma Scale  Eye Opening: Spontaneous  Best Verbal Response: Oriented  Best Motor Response: Obeys commands  Remedios Coma Scale Score: 15  Results for orders placed or performed during the hospital encounter of 03/20/22   Comprehensive Metabolic Panel   Result Value Ref Range    Glucose 105 74 - 106 mg/dl    CREATININE 0.8 0.6 - 1.3 mg/dl    BUN 13 7 - 18 mg/dl    Sodium 143 136 - 145 meq/l    Potassium 4.1 3.5 - 5.1 meq/l    Chloride 105 98 - 107 meq/l    CO2 31 21 - 32 meq/l    POC CALCIUM 8.6 8.5 - 10.1 mg/dl    AST 21 15 - 37 U/L    Alkaline Phosphatase 72 46 - 116 U/L    Total Protein 6.9 6.4 - 8.2 gm/dl    Albumin 3.4 3.4 - 5.0 gm/dl    Total Bilirubin 0.4 0.2 - 1.0 mg/dl    ALT 22 14 - 63 U/L   CBC with Auto Differential   Result Value Ref Range    WBC 8.0 4.8 - 10.8 thou/mm3    RBC 4.89 4.20 - 5.40 mill/mm3    Hemoglobin 14.8 12.0 - 16.0 gm/dl    Hematocrit 44.5 37.0 - 47.0 %    MCV 90.9 81.0 - 99.0 fL    MCH 30.2 27.0 - 31.0 pg    MCHC 33.2 33.0 - 37.0 gm/dl    RDW 13.1 11.5 - 14.5 %    Platelets 985 656 - 941 thou/mm3    MPV 7.0 (L) 7.4 - 10.4 fL    SEGS 74.5 43.0 - 75.0 %    Lymphocytes 16.5 15.0 - 47.0 %    Monocytes 6.1 0.0 - 12.0 %    Eosinophils % 2.3 0.0 - 4.0 %    Basophils 0.6 0.0 - 3.0 %   Glomerular Filtration Rate, Estimated   Result Value Ref Range    GFR, Estimated 79 (A) ml/min/1.73m2   ANION GAP   Result Value Ref Range    Anion Gap 7.0 (L) 8.0 - 16.0 meq/l   EKG 12 Lead   Result Value Ref Range Ventricular Rate 98 BPM    Atrial Rate 98 BPM    P-R Interval 132 ms    QRS Duration 80 ms    Q-T Interval 352 ms    QTc Calculation (Bazett) 449 ms    P Axis 40 degrees    R Axis 48 degrees    T Axis 25 degrees       Physical Exam:  Patient Vitals for the past 24 hrs:   BP Temp Temp src Pulse Resp SpO2 Height Weight   03/20/22 1603 123/78 98.2 °F (36.8 °C) Oral 90 16 96 % -- --   03/20/22 1315 138/65 98 °F (36.7 °C) Oral 87 18 -- 5' 6\" (1.676 m) 220 lb 0.3 oz (99.8 kg)   03/20/22 1234 119/77 -- -- 82 18 94 % -- --   03/20/22 1230 -- -- -- (!) 47 17 97 % -- --   03/20/22 1224 126/72 -- -- 94 19 96 % -- --   03/20/22 1213 -- -- -- 102 18 97 % -- --   03/20/22 1115 (!) 151/74 -- -- 99 19 97 % -- --   03/20/22 1109 (!) 158/91 -- -- 103 22 100 % -- --   03/20/22 1106 -- -- -- 97 18 95 % -- --   03/20/22 1106 -- -- -- -- -- 96 % -- --   03/20/22 1100 (!) 142/78 -- -- 97 16 98 % -- --   03/20/22 1036 (!) 143/80 -- -- 88 15 96 % -- --   03/20/22 0916 138/72 -- -- 86 19 -- -- --   03/20/22 0825 128/75 97.2 °F (36.2 °C) Temporal 85 20 99 % 5' 6\" (1.676 m) 220 lb (99.8 kg)     Primary Assessment:  Airway: Patent, trachea midline  Breathing: Breath sounds present and equal bilaterally, spontaneous, and unlabored  Circulation: Hemodynamically stable, 2+ central and peripheral pulses. Disability: GEORGE x 4, following commands. GCS =15    Secondary Assessment:  General: Alert, NAD. Head: Normocephalic, mid face stable. Tympanic membranes intact. Nares patent bilaterally, no epistaxis. Mouth clear of foreign bodies, no lacerations or abrasions. Eyes: PERRLA. EOMI. Nontraumatic. Neurologic: A & O x3. Following commands. CN 2-12 intact  Neck: trachea midline. Cervical spines NTTP midline, without step-offs, crepitus or deformity. Back:TL spines are NTTP midline, without step-offs, crepitus or deformity. No abrasions, contusions, or ecchymosis noted. Lungs: Clear to auscultation bilaterally.   Chest Wall: Chest rise symmetrical.  Chest wall without tenderness to palpation. No crepitus, deformities, lacerations, or abrasions. Heart: RRR. Normal S1/S2. No obvious M/G/R. Abdomen:  Soft, NTTP. No guarding. Non-peritoneal.  Pelvis:  NTTP, stable to compression. Femoral pulses 2+. GI/: No blood at the urinary meatus. No gross hematuria. Extremities: Left lower extremity with posterior splint and ace wrap in place. PMS intact. Radial pulses 2+bilaterally and DP/PT 2+ on right. Unable to palpate on left due to dressing. Skin: Skin warm and dry. Normal for ethnicity. Radiology:     XR ANKLE LEFT (MIN 3 VIEWS)   Final Result   Limited postreduction images. **This report has been created using voice recognition software. It may contain minor errors which are inherent in voice recognition technology. **      Final report electronically signed by Dr. Jossie Gordon on 3/20/2022 1:15 PM      XR CHEST PORTABLE   Final Result   Normal mobile chest.            **This report has been created using voice recognition software. It may contain minor errors which are inherent in voice recognition technology. **      Final report electronically signed by Dr. Clemencia Castillo on 3/20/2022 11:48 AM      CT ANKLE LEFT WO CONTRAST   Final Result   Trimalleolar fracture dislocation. The posterior malleolar and medial malleolar fractures are comminuted with several small bone fragments projected over the ankle. **This report has been created using voice recognition software. It may contain minor errors which are inherent in voice recognition technology. **      Final report electronically signed by Dr. Clemencia Castillo on 3/20/2022 11:57 AM      XR ANKLE LEFT (2 VIEWS)   Final Result   Status post reduction of the previously described fracture dislocation of the ankle. **This report has been created using voice recognition software. It may contain minor errors which are inherent in voice recognition technology. ** Final report electronically signed by Dr. Jose Hernandez on 3/20/2022 11:41 AM      XR ANKLE LEFT (2 VIEWS)   Final Result   Fracture dislocation of the ankle involving the medial malleolus, the distal tibia laterally, and the distal fibular shaft, as described above. **This report has been created using voice recognition software. It may contain minor errors which are inherent in voice recognition technology. **      Final report electronically signed by Dr. Jose Hernandez on 3/20/2022 8:57 AM        Fast Exam: No    Electronically signed by STEPHANY Phillip CNP on 3/20/2022 at 5:31 PM

## 2022-03-20 NOTE — PROGRESS NOTES
A 45year old admitted to 9k17 from ed. Splint intact on left lower extremity. INT intact in right antecubital.  Oriented patient to room and unit and plan of care. Discussed admitting orders with patient.   She verbalizes understanding

## 2022-03-20 NOTE — ED NOTES
X-Ray at bedside for films, pt medicated per order for pain      Debbie Stallworth RN  03/20/22 3098

## 2022-03-20 NOTE — ED NOTES
Pt resting in bed talking with family. No needs or concerns noted at this time. Call light in reach.      Marry Urias RN  03/20/22 0205

## 2022-03-20 NOTE — ED NOTES
Pt stable and loaded up into EMS squad and transferred to [facility]. EMS Splint still on the patient. She has good PMS in left foot / painful ROM in left ankle.       Jin Santillan, RN  03/20/22 7469 [facility], RN  03/20/22 7166

## 2022-03-20 NOTE — ED PROVIDER NOTES
[address]/[facility] COMPLAINT       Chief Complaint   Patient presents with    Fall    Ankle Pain    Ankle Injury       Nurses Notes reviewed and I agree except as noted in the HPI. HISTORY OF PRESENT ILLNESS    Khanh Sands is a 45 y.o. female who presents for evaluation of left ankle injury. Patient states that she was walking down the stairs and missed the last step. She states that when she fell she felt her right foot invert. There is no deformity and swelling. She has aching pain that she rates at an 8/10 in severity. No numbness or tingling. Event occurred just prior to arrival.  She is brought into the facility by EMS. REVIEW OF SYSTEMS     Review of Systems   Constitutional: Negative for activity change, appetite change, chills and fever. HENT: Negative for ear pain and sore throat. Respiratory: Negative for cough, shortness of breath and wheezing. Cardiovascular: Negative for chest pain and leg swelling. Gastrointestinal: Negative for abdominal pain, diarrhea, nausea and vomiting. Genitourinary: Negative for dysuria, flank pain and hematuria. Musculoskeletal: Positive for arthralgias and joint swelling. Negative for back pain, gait problem and neck pain. Skin: Negative for rash and wound. Neurological: Negative for weakness, light-headedness, numbness and headaches. Psychiatric/Behavioral: Negative for agitation and hallucinations. The patient is not nervous/anxious. PAST MEDICAL HISTORY    has a past medical history of Depression. SURGICAL HISTORY      has a past surgical history that includes Tonsillectomy; Gastric Band; and Tubal ligation.     CURRENT MEDICATIONS       Previous Medications    ESCITALOPRAM (LEXAPRO) 10 MG TABLET    TAKE 1 TABLET BY MOUTH DAILY    MULTIPLE VITAMINS-MINERALS (MULTIVITAMIN ADULT PO)    Take by mouth    OMEPRAZOLE (PRILOSEC) 40 MG DELAYED RELEASE CAPSULE    TAKE 1 CAPSULE BY MOUTH ONCE DAILY IN THE MORNING BEFORE BREAKFAST       ALLERGIES     has No Known Allergies. FAMILY HISTORY     She indicated that her mother is . She indicated that her father is . She indicated that her sister is alive. She indicated that both of her brothers are alive. family history includes Alcohol Abuse in her father; Cancer in her sister; Cancer (age of onset: 64) in her mother; Other in her father. SOCIAL HISTORY      reports that she has never smoked. She has never used smokeless tobacco. She reports current alcohol use. She reports that she does not use drugs. PHYSICAL EXAM     INITIAL VITALS:  height is 5' 6\" (1.676 m) and weight is 220 lb (99.8 kg). Her temporal temperature is 97.2 °F (36.2 °C). Her blood pressure is 138/72 and her pulse is 86. Her respiration is 19 and oxygen saturation is 99%. Physical Exam  Vitals and nursing note reviewed. Constitutional:       General: She is not in acute distress. Appearance: She is well-developed. HENT:      Head: Normocephalic and atraumatic. Eyes:      General:         Right eye: No discharge. Left eye: No discharge. Conjunctiva/sclera: Conjunctivae normal.   Cardiovascular:      Rate and Rhythm: Normal rate and regular rhythm. Pulses: Normal pulses. Heart sounds: Normal heart sounds. Pulmonary:      Effort: Pulmonary effort is normal.      Breath sounds: Normal breath sounds. Abdominal:      General: Bowel sounds are normal. There is no distension. Palpations: Abdomen is soft. There is no mass. Tenderness: There is no abdominal tenderness. Musculoskeletal:         General: Swelling (about the left ankle), tenderness (about the left ankle, especially the medial aspect) and deformity (left ankle) present. Comments: Range of motion of the left ankle not assessed secondary to deformity   Skin:     General: Skin is warm and dry.    Neurological:      Mental Status: She is alert and oriented to person, place, and time. Sensory: No sensory deficit. Psychiatric:         Mood and Affect: Mood normal.         Behavior: Behavior normal.         DIFFERENTIAL DIAGNOSIS:   Fracture, dislocation, sprain    DIAGNOSTIC RESULTS         RADIOLOGY: non-plain filmimages(s) such as CT, Ultrasound and MRI are read by the radiologist.  XR ANKLE LEFT (2 VIEWS)   Final Result   Fracture dislocation of the ankle involving the medial malleolus, the distal tibia laterally, and the distal fibular shaft, as described above. **This report has been created using voice recognition software. It may contain minor errors which are inherent in voice recognition technology. **      Final report electronically signed by Dr. Bong Griffin on 3/20/2022 8:57 AM            LABS:   Labs Reviewed   COMPREHENSIVE METABOLIC PANEL   CBC WITH AUTO DIFFERENTIAL       DEPARTMENT COURSE:   Vitals:    Vitals:    03/20/22 0825 03/20/22 0916   BP: 128/75 138/72   Pulse: 85 86   Resp: 20 19   Temp: 97.2 °F (36.2 °C)    TempSrc: Temporal    SpO2: 99%    Weight: 220 lb (99.8 kg)    Height: 5' 6\" (1.676 m)        MDM:  Patient presents for evaluation of left ankle injury. Patient is given a Norco tablet and x-ray imaging ordered demonstrate a closed displaced trimalleolar fracture of the left ankle. Case was discussed with podiatry resident Dr Evert Herron and ED physician Dr. Zehra Medina. Patient to be transferred to [facility]'s emergency department. Secondary to shift change, Dr. Rosaline Hayes assumed care. CRITICAL CARE:   None    CONSULTS:   Podiatry resident Dr Evert Herron, will see patient in ED   ED physician Dr. Zehra Medina, happy to accept transfer and will consult trauma upon patient's arrival .    PROCEDURES:  None    FINAL IMPRESSION      1. Closed displaced trimalleolar fracture of left ankle, initial encounter    2.  Fall down stairs, initial encounter          DISPOSITION/PLAN   Transfer    (Please note that portions of this note were completedwith a voice recognition program.  Efforts were made to edit the dictations but occasionally words are mis-transcribed.)    MD Arturo Goldman MD  03/20/22 7008

## 2022-03-20 NOTE — ED NOTES
Pt denied pain throughout removal of splint. Podiatry reduced without sedation. Pt tolerated well.      Marry Urias RN  03/20/22 6724

## 2022-03-20 NOTE — PLAN OF CARE
Problem: Falls - Risk of:  Goal: Will remain free from falls  Description: Will remain free from falls  Outcome: Ongoing  Note: Pt using call light appropriately to call for assistance to the commode. Pt is also compliant with use of non-skid slippers. Pt reports understanding of fall prevention when discussed. Problem: Daily Care:  Goal: Daily care needs are met  Description: Daily care needs are met  Outcome: Ongoing  Note: Pt participates in ADLS     Problem: Pain:  Goal: Patient's pain/discomfort is manageable  Description: Patient's pain/discomfort is manageable  Outcome: Ongoing  Note: Pt report pain at 4 on scale. Pt states oral/iv medication helping to achieve pain goal of a 2 on scale. Problem: Discharge Planning:  Goal: Patients continuum of care needs are met  Description: Patients continuum of care needs are met  Outcome: Ongoing  Note: Pt plans home at discharge. Care manager and social working helping with discharge needs. Problem: Cardiovascular  Goal: No DVT, peripheral vascular complications  Outcome: Ongoing  Note: Pt without s/s of DVT. Pt able to take prescribed anticoagulants and SCD in place (RLE) to help prevent development of DVT. Care plan reviewed with patient. Patient verbalizes understanding of the plan of care and contributes to goal setting.

## 2022-03-20 NOTE — ED NOTES
Report called to [facility] ED Charge nurse.       Pedro Santillan RN  03/20/22 4671

## 2022-03-20 NOTE — ED TRIAGE NOTES
Pt comes into ER trauma room 1 via [facility] with a left ankle injury / swelling / deformity after a 1 step fall. She hear and felt a POP in he left ankle as she was holding a baby at the time. No other injuries are reported. She has good PMS in her left foot. But ankle limited ROM. EMS has a splint in place as well as an ice pack.

## 2022-03-20 NOTE — ED NOTES
Dr. Leslie Robbins pager Podiatry and also talked to the main ER to set up transfer.        Rolf Larson RN  03/20/22 5604

## 2022-03-20 NOTE — ED PROVIDER NOTES
[address] [address]        Pt Name: Emilee Dee  MRN: [id number]  [place] 1970  Date of evaluation: 3/20/2022  Treating Resident Physician: Estee Gaston DO  Supervising Physician: [address] [facility]       Chief Complaint   Patient presents with   [facility] Fall    Ankle Pain    Ankle Injury     History obtained from the patient. HISTORY OF PRESENT ILLNESS    HPI  Emilee Dee is a 45 y.o. female who presents to the emergency department for evaluation of left ankle fracture. Patient was transferred here from [facility] emergency department after she was walking down stairs and missed the last step. This caused Kassandra to fall and felt immediate pain to the left ankle. Upon arrival pain was 8 out of 10 located primarily to the left ankle. No reported head strike or loss of consciousness. Decreased range of motion secondary to pain and obvious deformity. She admits to a mild amount of numbness. Denies fever, headache, chest pain or shortness of breath. The patient has no other acute complaints at this time. REVIEW OF SYSTEMS   Review of Systems   Constitutional: Negative for chills and fever. HENT: Negative for sinus pressure, sinus pain and sore throat. Respiratory: Negative for cough, chest tightness, shortness of breath and wheezing. Cardiovascular: Negative for chest pain, palpitations and leg swelling. Gastrointestinal: Negative for abdominal pain, constipation, diarrhea, nausea and vomiting. Endocrine: Negative for cold intolerance and heat intolerance. Genitourinary: Negative for difficulty urinating and dysuria. Musculoskeletal: Positive for arthralgias, gait problem, joint swelling and myalgias. Negative for back pain, neck pain and neck stiffness. Skin: Negative for color change and rash. Neurological: Positive for numbness.  Negative for dizziness, syncope, weakness, light-headedness and headaches. PAST MEDICAL AND SURGICAL HISTORY     Past Medical History:   Diagnosis Date    Depression      Past Surgical History:   Procedure Laterality Date    GASTRIC BAND      TONSILLECTOMY      TUBAL LIGATION           MEDICATIONS     Current Facility-Administered Medications:     propofol 200 MG/20ML injection, , , ,     ketamine (KETALAR) 50 MG/5ML injection, , , ,     ketamine (KETALAR) 50 MG/5ML injection, , , ,     propofol 200 MG/20ML injection, , , ,     Current Outpatient Medications:     escitalopram (LEXAPRO) 10 MG tablet, TAKE 1 TABLET BY MOUTH DAILY, Disp: 90 tablet, Rfl: 3    omeprazole (PRILOSEC) 40 MG delayed release capsule, TAKE 1 CAPSULE BY MOUTH ONCE DAILY IN THE MORNING BEFORE BREAKFAST, Disp: 90 capsule, Rfl: 0    Multiple Vitamins-Minerals (MULTIVITAMIN ADULT PO), Take by mouth, Disp: , Rfl:       SOCIAL HISTORY     Social History     Social History Narrative    Not on file     Social History     Tobacco Use    Smoking status: Never Smoker    Smokeless tobacco: Never Used   Substance Use Topics    Alcohol use: Yes    Drug use: Never         ALLERGIES   No Known Allergies      FAMILY HISTORY     Family History   Problem Relation Age of Onset    Cancer Mother 64        Colon cancer    Other Father         Overweight and alcohol abuse    Alcohol Abuse Father     Cancer Sister         Skin and thyroid cancer         PREVIOUS RECORDS   Previous records reviewed: This is this patient's first visit to [facility] ED, no previous records available on EMR. .        PHYSICAL EXAM     ED Triage Vitals [03/20/22 0825]   BP Temp Temp Source Pulse Resp SpO2 Height Weight   128/75 97.2 °F (36.2 °C) Temporal 85 20 99 % 5' 6\" (1.676 m) 220 lb (99.8 kg)     Initial vital signs and nursing assessment reviewed and normal. Body mass index is 35.51 kg/m². Pulsoximetry is normal per my interpretation.     Additional Vital Signs:  Vitals:    03/20/22 1230   BP:    Pulse: (!) 47   Resp: 17 Temp:    SpO2: 97%       Physical Exam  Constitutional:       General: She is not in acute distress. Appearance: She is not ill-appearing, toxic-appearing or diaphoretic. HENT:      Head: Normocephalic and atraumatic. Nose: Nose normal. No congestion or rhinorrhea. Mouth/Throat:      Mouth: Mucous membranes are moist.      Pharynx: Oropharynx is clear. No oropharyngeal exudate or posterior oropharyngeal erythema. Eyes:      General: No scleral icterus. Right eye: No discharge. Left eye: No discharge. Extraocular Movements: Extraocular movements intact. Conjunctiva/sclera: Conjunctivae normal.   Cardiovascular:      Rate and Rhythm: Normal rate and regular rhythm. Pulses: Normal pulses. Heart sounds: No murmur heard. No friction rub. No gallop. Pulmonary:      Effort: Pulmonary effort is normal.      Breath sounds: No wheezing, rhonchi or rales. Abdominal:      Palpations: Abdomen is soft. Tenderness: There is no abdominal tenderness. There is no guarding or rebound. Musculoskeletal:         General: Tenderness, deformity and signs of injury present. Right lower leg: No edema. Left lower leg: Edema present. Comments: Obvious deformity of the left ankle. Overlying ecchymosis. Significant swelling. Palpable dorsalis pedis pulses. Minimal amount of decreased sensation. Good capillary refill. Skin:     General: Skin is warm and dry. Capillary Refill: Capillary refill takes less than 2 seconds. Findings: Bruising (Left ankle) present. Neurological:      General: No focal deficit present. Mental Status: She is alert and oriented to person, place, and time. Procedural sedation    Date/Time: 3/20/2022 12:19 PM  Performed by: Vianca Ziegler DO  Authorized by: Tiana Partida DO     Consent:     Consent obtained:  Written    Consent given by:  Patient and parent    Risks discussed:   Allergic reaction, dysrhythmia, inadequate sedation, nausea, vomiting, prolonged hypoxia resulting in organ damage, prolonged sedation necessitating reversal and respiratory compromise necessitating ventilatory assistance and intubation    Alternatives discussed:  Regional anesthesia  Indications:     Procedure performed:  Fracture reduction    Procedure necessitating sedation performed by:  Different physician    Intended level of sedation:  Moderate (conscious sedation)  Pre-sedation assessment:     Neck mobility: normal      Mouth opening:  3 or more finger widths    Mallampati score:  III - soft palate, base of uvula visible    Pre-sedation assessments completed and reviewed: airway patency and anesthesia/sedation history    Immediate pre-procedure details:     Reassessment: Patient reassessed immediately prior to procedure      Reviewed: vital signs      Verified: bag valve mask available, emergency equipment available, intubation equipment available, IV patency confirmed, oxygen available and suction available    Procedure details (see MAR for exact dosages):     Preoxygenation:  Nasal cannula    Sedation:  Ketamine (and propofol)    Intra-procedure monitoring:  Blood pressure monitoring, continuous capnometry, continuous pulse oximetry, frequent LOC assessments and frequent vital sign checks    Intra-procedure events: hypoxia      Intra-procedure management:  Airway repositioning  Post-procedure details:     Post-sedation assessments completed and reviewed: airway patency, cardiovascular function, mental status and respiratory function      Patient is stable for discharge or admission: yes      Patient tolerance: Tolerated well, no immediate complications        MEDICAL DECISION MAKING   Initial Assessment:   3 40-year-old female resting on the cot no acute distress. Significant pain decrease after hematoma block performed by podiatry. Trip and fall down 1 step resulting in left trimalar fracture.   Patient was transferred here from [facility]. No head strike or loss of consciousness. No other injury identified. Resting with family at bedside. Vital signs are stable without hypotension, fever, tachycardia, hypoxia. No concern at this time for ischemic lower extremity. Compartments are soft. Plan:    Basic labs   Sedation   Reduction   Splint   Repeat imaging including CT ankle   Admit versus discharge based on podiatry recommendations and plan for surgery        ED RESULTS   Laboratory results:  Labs Reviewed   CBC WITH AUTO DIFFERENTIAL - Abnormal; Notable for the following components:       Result Value    MPV 7.0 (*)     All other components within normal limits   GLOMERULAR FILTRATION RATE, ESTIMATED - Abnormal; Notable for the following components:    GFR, Estimated 79 (*)     All other components within normal limits   ANION GAP - Abnormal; Notable for the following components:    Anion Gap 7.0 (*)     All other components within normal limits   COMPREHENSIVE METABOLIC PANEL       Radiologic studies results:  XR CHEST PORTABLE   Final Result   Normal mobile chest.            **This report has been created using voice recognition software. It may contain minor errors which are inherent in voice recognition technology. **      Final report electronically signed by Dr. Vinita Amin on 3/20/2022 11:48 AM      CT ANKLE LEFT WO CONTRAST   Final Result   Trimalleolar fracture dislocation. The posterior malleolar and medial malleolar fractures are comminuted with several small bone fragments projected over the ankle. **This report has been created using voice recognition software. It may contain minor errors which are inherent in voice recognition technology. **      Final report electronically signed by Dr. Vinita Amin on 3/20/2022 11:57 AM      XR ANKLE LEFT (2 VIEWS)   Final Result   Status post reduction of the previously described fracture dislocation of the ankle.             **This report has been created using voice recognition software. It may contain minor errors which are inherent in voice recognition technology. **      Final report electronically signed by Dr. Flakita Ceja on 3/20/2022 11:41 AM      XR ANKLE LEFT (2 VIEWS)   Final Result   Fracture dislocation of the ankle involving the medial malleolus, the distal tibia laterally, and the distal fibular shaft, as described above. **This report has been created using voice recognition software. It may contain minor errors which are inherent in voice recognition technology. **      Final report electronically signed by Dr. Flakita Ceja on 3/20/2022 8:57 AM      XR ANKLE LEFT (MIN 3 VIEWS)    (Results Pending)       ED Medications administered this visit:   Medications   propofol 200 MG/20ML injection (  Canceled Entry 3/20/22 1222)   ketamine (KETALAR) 50 MG/5ML injection (  Canceled Entry 3/20/22 1222)   ketamine (KETALAR) 50 MG/5ML injection (has no administration in time range)   propofol 200 MG/20ML injection (has no administration in time range)   HYDROcodone-acetaminophen (NORCO) 5-325 MG per tablet 1 tablet (1 tablet Oral Given 3/20/22 0845)   ondansetron (ZOFRAN) injection 4 mg (4 mg IntraVENous Given 3/20/22 0950)   morphine injection 4 mg (4 mg IntraVENous Given 3/20/22 0950)   lidocaine-EPINEPHrine 1 %-1:482151 injection (  Given 3/20/22 1057)   ketamine (KETALAR) injection (50 mg IntraVENous Given 3/20/22 1102)   propofol injection (10 mg IntraVENous New Bag 3/20/22 1103)         ED COURSE          MEDICATION CHANGES     Current Discharge Medication List            FINAL DISPOSITION     Final diagnoses:   Closed displaced trimalleolar fracture of left ankle, initial encounter   Fall down stairs, initial encounter   DVT prophylaxis     Condition: condition: stable  Dispo: Admit to Trauma service      This transcription was electronically signed.  Parts of this transcriptions may have been dictated by use of voice recognition software and electronically transcribed, and parts may have been transcribed with the assistance of an ED scribe. The transcription may contain errors not detected in proofreading. Please refer to my supervising physician's documentation if my documentation differs.     Electronically Signed: Navneet Colon DO, 03/20/22, 12:36 PM       Navneet Cloon DO  Resident  03/20/22 8045

## 2022-03-20 NOTE — PROGRESS NOTES
RCP at bedside for conscience sedation. End tidal co2 applied and was reading 38. Sats dropped to 88% so O2 was added at 5lpm. sats increased and o2 was slowly weaned off.

## 2022-03-21 ENCOUNTER — APPOINTMENT (OUTPATIENT)
Dept: GENERAL RADIOLOGY | Age: 52
DRG: 494 | End: 2022-03-21
Payer: COMMERCIAL

## 2022-03-21 ENCOUNTER — ANESTHESIA EVENT (OUTPATIENT)
Dept: OPERATING ROOM | Age: 52
DRG: 494 | End: 2022-03-21
Payer: COMMERCIAL

## 2022-03-21 ENCOUNTER — ANESTHESIA (OUTPATIENT)
Dept: OPERATING ROOM | Age: 52
DRG: 494 | End: 2022-03-21
Payer: COMMERCIAL

## 2022-03-21 VITALS
OXYGEN SATURATION: 100 % | RESPIRATION RATE: 17 BRPM | DIASTOLIC BLOOD PRESSURE: 62 MMHG | SYSTOLIC BLOOD PRESSURE: 114 MMHG | TEMPERATURE: 98.2 F

## 2022-03-21 LAB
ALBUMIN SERPL-MCNC: 3.2 G/DL (ref 3.5–5.1)
ALP BLD-CCNC: 54 U/L (ref 38–126)
ALT SERPL-CCNC: 11 U/L (ref 11–66)
ANION GAP SERPL CALCULATED.3IONS-SCNC: 9 MEQ/L (ref 8–16)
AST SERPL-CCNC: 16 U/L (ref 5–40)
BASOPHILS # BLD: 0.2 %
BASOPHILS ABSOLUTE: 0 THOU/MM3 (ref 0–0.1)
BILIRUB SERPL-MCNC: 0.3 MG/DL (ref 0.3–1.2)
BUN BLDV-MCNC: 9 MG/DL (ref 7–22)
CALCIUM SERPL-MCNC: 8.6 MG/DL (ref 8.5–10.5)
CHLORIDE BLD-SCNC: 109 MEQ/L (ref 98–111)
CO2: 27 MEQ/L (ref 23–33)
CREAT SERPL-MCNC: 0.7 MG/DL (ref 0.4–1.2)
EOSINOPHIL # BLD: 0.9 %
EOSINOPHILS ABSOLUTE: 0.1 THOU/MM3 (ref 0–0.4)
ERYTHROCYTE [DISTWIDTH] IN BLOOD BY AUTOMATED COUNT: 12.9 % (ref 11.5–14.5)
ERYTHROCYTE [DISTWIDTH] IN BLOOD BY AUTOMATED COUNT: 45.9 FL (ref 35–45)
GFR SERPL CREATININE-BSD FRML MDRD: 87 ML/MIN/1.73M2
GLUCOSE BLD-MCNC: 104 MG/DL (ref 70–108)
HCT VFR BLD CALC: 39.3 % (ref 37–47)
HEMOGLOBIN: 12.3 GM/DL (ref 12–16)
IMMATURE GRANS (ABS): 0.03 THOU/MM3 (ref 0–0.07)
IMMATURE GRANULOCYTES: 0.4 %
LYMPHOCYTES # BLD: 25.3 %
LYMPHOCYTES ABSOLUTE: 2 THOU/MM3 (ref 1–4.8)
MCH RBC QN AUTO: 30.1 PG (ref 26–33)
MCHC RBC AUTO-ENTMCNC: 31.3 GM/DL (ref 32.2–35.5)
MCV RBC AUTO: 96.1 FL (ref 81–99)
MONOCYTES # BLD: 9.2 %
MONOCYTES ABSOLUTE: 0.7 THOU/MM3 (ref 0.4–1.3)
NUCLEATED RED BLOOD CELLS: 0 /100 WBC
PLATELET # BLD: 305 THOU/MM3 (ref 130–400)
PMV BLD AUTO: 9.4 FL (ref 9.4–12.4)
POTASSIUM REFLEX MAGNESIUM: 4.7 MEQ/L (ref 3.5–5.2)
RBC # BLD: 4.09 MILL/MM3 (ref 4.2–5.4)
SEG NEUTROPHILS: 64 %
SEGMENTED NEUTROPHILS ABSOLUTE COUNT: 5.2 THOU/MM3 (ref 1.8–7.7)
SODIUM BLD-SCNC: 145 MEQ/L (ref 135–145)
TOTAL PROTEIN: 5.4 G/DL (ref 6.1–8)
WBC # BLD: 8.1 THOU/MM3 (ref 4.8–10.8)

## 2022-03-21 PROCEDURE — 3700000000 HC ANESTHESIA ATTENDED CARE: Performed by: PODIATRIST

## 2022-03-21 PROCEDURE — 2500000003 HC RX 250 WO HCPCS: Performed by: NURSE ANESTHETIST, CERTIFIED REGISTERED

## 2022-03-21 PROCEDURE — 6360000002 HC RX W HCPCS: Performed by: STUDENT IN AN ORGANIZED HEALTH CARE EDUCATION/TRAINING PROGRAM

## 2022-03-21 PROCEDURE — 85025 COMPLETE CBC W/AUTO DIFF WBC: CPT

## 2022-03-21 PROCEDURE — 6370000000 HC RX 637 (ALT 250 FOR IP): Performed by: NURSE PRACTITIONER

## 2022-03-21 PROCEDURE — 2580000003 HC RX 258: Performed by: NURSE PRACTITIONER

## 2022-03-21 PROCEDURE — 80053 COMPREHEN METABOLIC PANEL: CPT

## 2022-03-21 PROCEDURE — 2720000010 HC SURG SUPPLY STERILE: Performed by: PODIATRIST

## 2022-03-21 PROCEDURE — 7100000000 HC PACU RECOVERY - FIRST 15 MIN: Performed by: PODIATRIST

## 2022-03-21 PROCEDURE — 6360000002 HC RX W HCPCS: Performed by: NURSE ANESTHETIST, CERTIFIED REGISTERED

## 2022-03-21 PROCEDURE — 2580000003 HC RX 258: Performed by: STUDENT IN AN ORGANIZED HEALTH CARE EDUCATION/TRAINING PROGRAM

## 2022-03-21 PROCEDURE — 64445 NJX AA&/STRD SCIATIC NRV IMG: CPT | Performed by: STUDENT IN AN ORGANIZED HEALTH CARE EDUCATION/TRAINING PROGRAM

## 2022-03-21 PROCEDURE — 3209999900 FLUORO FOR SURGICAL PROCEDURES

## 2022-03-21 PROCEDURE — 97161 PT EVAL LOW COMPLEX 20 MIN: CPT

## 2022-03-21 PROCEDURE — 97530 THERAPEUTIC ACTIVITIES: CPT

## 2022-03-21 PROCEDURE — 6370000000 HC RX 637 (ALT 250 FOR IP): Performed by: STUDENT IN AN ORGANIZED HEALTH CARE EDUCATION/TRAINING PROGRAM

## 2022-03-21 PROCEDURE — 0QSK04Z REPOSITION LEFT FIBULA WITH INTERNAL FIXATION DEVICE, OPEN APPROACH: ICD-10-PCS | Performed by: PODIATRIST

## 2022-03-21 PROCEDURE — 7100000001 HC PACU RECOVERY - ADDTL 15 MIN: Performed by: PODIATRIST

## 2022-03-21 PROCEDURE — 36415 COLL VENOUS BLD VENIPUNCTURE: CPT

## 2022-03-21 PROCEDURE — C1713 ANCHOR/SCREW BN/BN,TIS/BN: HCPCS | Performed by: PODIATRIST

## 2022-03-21 PROCEDURE — 3E0T3BZ INTRODUCTION OF ANESTHETIC AGENT INTO PERIPHERAL NERVES AND PLEXI, PERCUTANEOUS APPROACH: ICD-10-PCS | Performed by: STUDENT IN AN ORGANIZED HEALTH CARE EDUCATION/TRAINING PROGRAM

## 2022-03-21 PROCEDURE — 1200000000 HC SEMI PRIVATE

## 2022-03-21 PROCEDURE — 3600000004 HC SURGERY LEVEL 4 BASE: Performed by: PODIATRIST

## 2022-03-21 PROCEDURE — 0QSH04Z REPOSITION LEFT TIBIA WITH INTERNAL FIXATION DEVICE, OPEN APPROACH: ICD-10-PCS | Performed by: PODIATRIST

## 2022-03-21 PROCEDURE — 3600000014 HC SURGERY LEVEL 4 ADDTL 15MIN: Performed by: PODIATRIST

## 2022-03-21 PROCEDURE — 73600 X-RAY EXAM OF ANKLE: CPT

## 2022-03-21 PROCEDURE — 6360000002 HC RX W HCPCS: Performed by: NURSE PRACTITIONER

## 2022-03-21 PROCEDURE — 2709999900 HC NON-CHARGEABLE SUPPLY: Performed by: PODIATRIST

## 2022-03-21 PROCEDURE — 3700000001 HC ADD 15 MINUTES (ANESTHESIA): Performed by: PODIATRIST

## 2022-03-21 DEVICE — EVOS 2.7MM X 16MM LOCKING SCREW T8 SELF-TAPPING
Type: IMPLANTABLE DEVICE | Site: ANKLE | Status: FUNCTIONAL
Brand: EVOS

## 2022-03-21 DEVICE — EVOS 2.7MM X 18MM LOCKING SCREW T8 SELF-TAPPING
Type: IMPLANTABLE DEVICE | Site: ANKLE | Status: FUNCTIONAL
Brand: EVOS

## 2022-03-21 DEVICE — EVOS POSTEROLATERAL DISTAL FIBULA                                    ANTI-GLIDE PLATE 7 HOLE LEFT 80MM
Type: IMPLANTABLE DEVICE | Site: ANKLE | Status: FUNCTIONAL
Brand: EVOS

## 2022-03-21 DEVICE — EVOS 3.5MM X 12MM LOCKING SCREW SELF-TAPPING
Type: IMPLANTABLE DEVICE | Site: ANKLE | Status: FUNCTIONAL
Brand: EVOS

## 2022-03-21 DEVICE — EVOS 2.7MM Y-PLATE 5 HOLE HEAD 5                                    HOLE SHAFT
Type: IMPLANTABLE DEVICE | Site: ANKLE | Status: FUNCTIONAL
Brand: EVOS

## 2022-03-21 DEVICE — EVOS 2.7MM X 20MM LOCKING SCREW T8 SELF-TAPPING
Type: IMPLANTABLE DEVICE | Site: ANKLE | Status: FUNCTIONAL
Brand: EVOS

## 2022-03-21 DEVICE — EVOS 2.7MM X 14MM LOCKING SCREW T8 SELF-TAPPING
Type: IMPLANTABLE DEVICE | Site: ANKLE | Status: FUNCTIONAL
Brand: EVOS

## 2022-03-21 DEVICE — 4.0MM FULLY THREADED CANNULATED                                    SCREW - 70MM: Type: IMPLANTABLE DEVICE | Site: ANKLE | Status: FUNCTIONAL

## 2022-03-21 RX ORDER — MIDAZOLAM HYDROCHLORIDE 1 MG/ML
INJECTION INTRAMUSCULAR; INTRAVENOUS PRN
Status: DISCONTINUED | OUTPATIENT
Start: 2022-03-21 | End: 2022-03-21 | Stop reason: SDUPTHER

## 2022-03-21 RX ORDER — OXYCODONE HYDROCHLORIDE AND ACETAMINOPHEN 5; 325 MG/1; MG/1
1 TABLET ORAL EVERY 6 HOURS PRN
Qty: 28 TABLET | Refills: 0 | Status: SHIPPED | OUTPATIENT
Start: 2022-03-21 | End: 2022-03-28

## 2022-03-21 RX ORDER — SODIUM CHLORIDE 0.9 % (FLUSH) 0.9 %
5-40 SYRINGE (ML) INJECTION EVERY 12 HOURS SCHEDULED
Status: DISCONTINUED | OUTPATIENT
Start: 2022-03-21 | End: 2022-03-21 | Stop reason: HOSPADM

## 2022-03-21 RX ORDER — ONDANSETRON 2 MG/ML
4 INJECTION INTRAMUSCULAR; INTRAVENOUS
Status: DISCONTINUED | OUTPATIENT
Start: 2022-03-21 | End: 2022-03-21 | Stop reason: HOSPADM

## 2022-03-21 RX ORDER — FENTANYL CITRATE 50 UG/ML
INJECTION, SOLUTION INTRAMUSCULAR; INTRAVENOUS PRN
Status: DISCONTINUED | OUTPATIENT
Start: 2022-03-21 | End: 2022-03-21 | Stop reason: SDUPTHER

## 2022-03-21 RX ORDER — HYDROMORPHONE HCL 110MG/55ML
PATIENT CONTROLLED ANALGESIA SYRINGE INTRAVENOUS PRN
Status: DISCONTINUED | OUTPATIENT
Start: 2022-03-21 | End: 2022-03-21 | Stop reason: SDUPTHER

## 2022-03-21 RX ORDER — ONDANSETRON 2 MG/ML
INJECTION INTRAMUSCULAR; INTRAVENOUS PRN
Status: DISCONTINUED | OUTPATIENT
Start: 2022-03-21 | End: 2022-03-21 | Stop reason: SDUPTHER

## 2022-03-21 RX ORDER — SODIUM CHLORIDE 0.9 % (FLUSH) 0.9 %
5-40 SYRINGE (ML) INJECTION EVERY 12 HOURS SCHEDULED
Status: DISCONTINUED | OUTPATIENT
Start: 2022-03-21 | End: 2022-03-23 | Stop reason: HOSPADM

## 2022-03-21 RX ORDER — DEXAMETHASONE SODIUM PHOSPHATE 10 MG/ML
INJECTION, EMULSION INTRAMUSCULAR; INTRAVENOUS PRN
Status: DISCONTINUED | OUTPATIENT
Start: 2022-03-21 | End: 2022-03-21 | Stop reason: SDUPTHER

## 2022-03-21 RX ORDER — PROPOFOL 10 MG/ML
INJECTION, EMULSION INTRAVENOUS PRN
Status: DISCONTINUED | OUTPATIENT
Start: 2022-03-21 | End: 2022-03-21 | Stop reason: SDUPTHER

## 2022-03-21 RX ORDER — SODIUM CHLORIDE 0.9 % (FLUSH) 0.9 %
5-40 SYRINGE (ML) INJECTION PRN
Status: DISCONTINUED | OUTPATIENT
Start: 2022-03-21 | End: 2022-03-21 | Stop reason: HOSPADM

## 2022-03-21 RX ORDER — SODIUM CHLORIDE 0.9 % (FLUSH) 0.9 %
5-40 SYRINGE (ML) INJECTION PRN
Status: DISCONTINUED | OUTPATIENT
Start: 2022-03-21 | End: 2022-03-23 | Stop reason: HOSPADM

## 2022-03-21 RX ORDER — CYCLOBENZAPRINE HCL 10 MG
10 TABLET ORAL 3 TIMES DAILY PRN
Qty: 30 TABLET | Refills: 0 | Status: SHIPPED | OUTPATIENT
Start: 2022-03-21 | End: 2022-03-31

## 2022-03-21 RX ORDER — SODIUM CHLORIDE 9 MG/ML
INJECTION, SOLUTION INTRAVENOUS CONTINUOUS
Status: DISCONTINUED | OUTPATIENT
Start: 2022-03-21 | End: 2022-03-23 | Stop reason: HOSPADM

## 2022-03-21 RX ORDER — NEOSTIGMINE METHYLSULFATE 5 MG/5 ML
SYRINGE (ML) INTRAVENOUS PRN
Status: DISCONTINUED | OUTPATIENT
Start: 2022-03-21 | End: 2022-03-21 | Stop reason: SDUPTHER

## 2022-03-21 RX ORDER — FENTANYL CITRATE 50 UG/ML
50 INJECTION, SOLUTION INTRAMUSCULAR; INTRAVENOUS EVERY 5 MIN PRN
Status: DISCONTINUED | OUTPATIENT
Start: 2022-03-21 | End: 2022-03-21 | Stop reason: HOSPADM

## 2022-03-21 RX ORDER — ROCURONIUM BROMIDE 10 MG/ML
INJECTION, SOLUTION INTRAVENOUS PRN
Status: DISCONTINUED | OUTPATIENT
Start: 2022-03-21 | End: 2022-03-21 | Stop reason: SDUPTHER

## 2022-03-21 RX ORDER — GLYCOPYRROLATE 1 MG/5 ML
SYRINGE (ML) INTRAVENOUS PRN
Status: DISCONTINUED | OUTPATIENT
Start: 2022-03-21 | End: 2022-03-21 | Stop reason: SDUPTHER

## 2022-03-21 RX ORDER — CEFAZOLIN SODIUM 1 G/3ML
INJECTION, POWDER, FOR SOLUTION INTRAMUSCULAR; INTRAVENOUS PRN
Status: DISCONTINUED | OUTPATIENT
Start: 2022-03-21 | End: 2022-03-21 | Stop reason: SDUPTHER

## 2022-03-21 RX ORDER — MEPERIDINE HYDROCHLORIDE 25 MG/ML
12.5 INJECTION INTRAMUSCULAR; INTRAVENOUS; SUBCUTANEOUS EVERY 5 MIN PRN
Status: DISCONTINUED | OUTPATIENT
Start: 2022-03-21 | End: 2022-03-21 | Stop reason: HOSPADM

## 2022-03-21 RX ORDER — SODIUM CHLORIDE 9 MG/ML
25 INJECTION, SOLUTION INTRAVENOUS PRN
Status: DISCONTINUED | OUTPATIENT
Start: 2022-03-21 | End: 2022-03-23 | Stop reason: HOSPADM

## 2022-03-21 RX ORDER — RIVAROXABAN 10 MG/1
10 TABLET, FILM COATED ORAL
Qty: 14 TABLET | Refills: 1 | Status: SHIPPED | OUTPATIENT
Start: 2022-03-21 | End: 2022-10-25

## 2022-03-21 RX ORDER — TRAMADOL HYDROCHLORIDE 50 MG/1
50 TABLET ORAL EVERY 6 HOURS PRN
Qty: 28 TABLET | Refills: 0 | Status: SHIPPED | OUTPATIENT
Start: 2022-03-21 | End: 2022-03-28

## 2022-03-21 RX ORDER — SODIUM CHLORIDE 9 MG/ML
25 INJECTION, SOLUTION INTRAVENOUS PRN
Status: DISCONTINUED | OUTPATIENT
Start: 2022-03-21 | End: 2022-03-21 | Stop reason: HOSPADM

## 2022-03-21 RX ORDER — DIPHENHYDRAMINE HYDROCHLORIDE 50 MG/ML
12.5 INJECTION INTRAMUSCULAR; INTRAVENOUS
Status: DISCONTINUED | OUTPATIENT
Start: 2022-03-21 | End: 2022-03-21 | Stop reason: HOSPADM

## 2022-03-21 RX ORDER — ESCITALOPRAM OXALATE 10 MG/1
10 TABLET ORAL DAILY
Status: DISCONTINUED | OUTPATIENT
Start: 2022-03-21 | End: 2022-03-23 | Stop reason: HOSPADM

## 2022-03-21 RX ORDER — LIDOCAINE HYDROCHLORIDE 20 MG/ML
INJECTION, SOLUTION INTRAVENOUS PRN
Status: DISCONTINUED | OUTPATIENT
Start: 2022-03-21 | End: 2022-03-21 | Stop reason: SDUPTHER

## 2022-03-21 RX ADMIN — SODIUM CHLORIDE: 9 INJECTION, SOLUTION INTRAVENOUS at 17:53

## 2022-03-21 RX ADMIN — SODIUM CHLORIDE: 9 INJECTION, SOLUTION INTRAVENOUS at 22:25

## 2022-03-21 RX ADMIN — CEFAZOLIN 2000 MG: 1 INJECTION, POWDER, FOR SOLUTION INTRAMUSCULAR; INTRAVENOUS at 17:26

## 2022-03-21 RX ADMIN — MORPHINE SULFATE 4 MG: 4 INJECTION, SOLUTION INTRAMUSCULAR; INTRAVENOUS at 13:33

## 2022-03-21 RX ADMIN — PHENYLEPHRINE HYDROCHLORIDE 200 MCG: 10 INJECTION INTRAVENOUS at 17:40

## 2022-03-21 RX ADMIN — ONDANSETRON HYDROCHLORIDE 4 MG: 4 INJECTION, SOLUTION INTRAMUSCULAR; INTRAVENOUS at 19:26

## 2022-03-21 RX ADMIN — SODIUM CHLORIDE: 9 INJECTION, SOLUTION INTRAVENOUS at 22:28

## 2022-03-21 RX ADMIN — HYDROMORPHONE HYDROCHLORIDE 0.5 MG: 2 INJECTION INTRAMUSCULAR; INTRAVENOUS; SUBCUTANEOUS at 20:52

## 2022-03-21 RX ADMIN — FAMOTIDINE 20 MG: 20 TABLET ORAL at 08:09

## 2022-03-21 RX ADMIN — MORPHINE SULFATE 4 MG: 4 INJECTION, SOLUTION INTRAMUSCULAR; INTRAVENOUS at 16:28

## 2022-03-21 RX ADMIN — ROPIVACAINE HYDROCHLORIDE 30 ML: 5 INJECTION, SOLUTION EPIDURAL; INFILTRATION; PERINEURAL at 21:29

## 2022-03-21 RX ADMIN — ROCURONIUM BROMIDE 50 MG: 10 INJECTION INTRAVENOUS at 17:22

## 2022-03-21 RX ADMIN — MORPHINE SULFATE 2 MG: 2 INJECTION, SOLUTION INTRAMUSCULAR; INTRAVENOUS at 03:42

## 2022-03-21 RX ADMIN — LIDOCAINE HYDROCHLORIDE 100 MG: 20 INJECTION, SOLUTION INTRAVENOUS at 17:22

## 2022-03-21 RX ADMIN — FENTANYL CITRATE 100 MCG: 50 INJECTION, SOLUTION INTRAMUSCULAR; INTRAVENOUS at 18:41

## 2022-03-21 RX ADMIN — HYDROMORPHONE HYDROCHLORIDE 0.2 MG: 2 INJECTION INTRAMUSCULAR; INTRAVENOUS; SUBCUTANEOUS at 21:05

## 2022-03-21 RX ADMIN — MORPHINE SULFATE 4 MG: 4 INJECTION, SOLUTION INTRAMUSCULAR; INTRAVENOUS at 10:20

## 2022-03-21 RX ADMIN — Medication 0.4 MG: at 20:44

## 2022-03-21 RX ADMIN — HYDROMORPHONE HYDROCHLORIDE 0.2 MG: 2 INJECTION INTRAMUSCULAR; INTRAVENOUS; SUBCUTANEOUS at 19:38

## 2022-03-21 RX ADMIN — MORPHINE SULFATE 2 MG: 2 INJECTION, SOLUTION INTRAMUSCULAR; INTRAVENOUS at 00:57

## 2022-03-21 RX ADMIN — HYDROCODONE BITARTRATE AND ACETAMINOPHEN 2 TABLET: 5; 325 TABLET ORAL at 08:09

## 2022-03-21 RX ADMIN — MORPHINE SULFATE 4 MG: 4 INJECTION, SOLUTION INTRAMUSCULAR; INTRAVENOUS at 05:46

## 2022-03-21 RX ADMIN — FENTANYL CITRATE 100 MCG: 50 INJECTION, SOLUTION INTRAMUSCULAR; INTRAVENOUS at 17:19

## 2022-03-21 RX ADMIN — PHENYLEPHRINE HYDROCHLORIDE 100 MCG: 10 INJECTION INTRAVENOUS at 17:36

## 2022-03-21 RX ADMIN — MORPHINE SULFATE 2 MG: 2 INJECTION, SOLUTION INTRAMUSCULAR; INTRAVENOUS at 03:10

## 2022-03-21 RX ADMIN — HYDROCODONE BITARTRATE AND ACETAMINOPHEN 2 TABLET: 5; 325 TABLET ORAL at 12:22

## 2022-03-21 RX ADMIN — DEXAMETHASONE SODIUM PHOSPHATE 8 MG: 10 INJECTION, EMULSION INTRAMUSCULAR; INTRAVENOUS at 17:34

## 2022-03-21 RX ADMIN — SODIUM CHLORIDE: 9 INJECTION, SOLUTION INTRAVENOUS at 13:36

## 2022-03-21 RX ADMIN — HYDROCODONE BITARTRATE AND ACETAMINOPHEN 1 TABLET: 5; 325 TABLET ORAL at 21:31

## 2022-03-21 RX ADMIN — MIDAZOLAM 2 MG: 1 INJECTION INTRAMUSCULAR; INTRAVENOUS at 17:16

## 2022-03-21 RX ADMIN — HYDROMORPHONE HYDROCHLORIDE 0.2 MG: 2 INJECTION INTRAMUSCULAR; INTRAVENOUS; SUBCUTANEOUS at 19:15

## 2022-03-21 RX ADMIN — PROPOFOL 150 MG: 10 INJECTION, EMULSION INTRAVENOUS at 17:22

## 2022-03-21 RX ADMIN — Medication 2 MG: at 20:44

## 2022-03-21 RX ADMIN — SODIUM CHLORIDE: 9 INJECTION, SOLUTION INTRAVENOUS at 01:03

## 2022-03-21 RX ADMIN — ESCITALOPRAM OXALATE 10 MG: 10 TABLET ORAL at 13:23

## 2022-03-21 ASSESSMENT — PAIN SCALES - GENERAL
PAINLEVEL_OUTOF10: 4
PAINLEVEL_OUTOF10: 0
PAINLEVEL_OUTOF10: 5
PAINLEVEL_OUTOF10: 3
PAINLEVEL_OUTOF10: 8
PAINLEVEL_OUTOF10: 3
PAINLEVEL_OUTOF10: 5
PAINLEVEL_OUTOF10: 8
PAINLEVEL_OUTOF10: 5
PAINLEVEL_OUTOF10: 0
PAINLEVEL_OUTOF10: 7
PAINLEVEL_OUTOF10: 3
PAINLEVEL_OUTOF10: 7
PAINLEVEL_OUTOF10: 2
PAINLEVEL_OUTOF10: 9
PAINLEVEL_OUTOF10: 6
PAINLEVEL_OUTOF10: 0
PAINLEVEL_OUTOF10: 5

## 2022-03-21 ASSESSMENT — PULMONARY FUNCTION TESTS
PIF_VALUE: 23
PIF_VALUE: 19
PIF_VALUE: 20
PIF_VALUE: 20
PIF_VALUE: 15
PIF_VALUE: 20
PIF_VALUE: 21
PIF_VALUE: 3
PIF_VALUE: 21
PIF_VALUE: 20
PIF_VALUE: 22
PIF_VALUE: 21
PIF_VALUE: 20
PIF_VALUE: 21
PIF_VALUE: 20
PIF_VALUE: 18
PIF_VALUE: 23
PIF_VALUE: 3
PIF_VALUE: 21
PIF_VALUE: 0
PIF_VALUE: 21
PIF_VALUE: 20
PIF_VALUE: 19
PIF_VALUE: 18
PIF_VALUE: 21
PIF_VALUE: 20
PIF_VALUE: 19
PIF_VALUE: 22
PIF_VALUE: 19
PIF_VALUE: 21
PIF_VALUE: 21
PIF_VALUE: 20
PIF_VALUE: 21
PIF_VALUE: 23
PIF_VALUE: 19
PIF_VALUE: 22
PIF_VALUE: 21
PIF_VALUE: 18
PIF_VALUE: 20
PIF_VALUE: 20
PIF_VALUE: 22
PIF_VALUE: 12
PIF_VALUE: 21
PIF_VALUE: 17
PIF_VALUE: 21
PIF_VALUE: 2
PIF_VALUE: 20
PIF_VALUE: 15
PIF_VALUE: 18
PIF_VALUE: 21
PIF_VALUE: 19
PIF_VALUE: 21
PIF_VALUE: 20
PIF_VALUE: 21
PIF_VALUE: 22
PIF_VALUE: 19
PIF_VALUE: 15
PIF_VALUE: 20
PIF_VALUE: 20
PIF_VALUE: 19
PIF_VALUE: 5
PIF_VALUE: 19
PIF_VALUE: 18
PIF_VALUE: 20
PIF_VALUE: 19
PIF_VALUE: 21
PIF_VALUE: 20
PIF_VALUE: 19
PIF_VALUE: 19
PIF_VALUE: 20
PIF_VALUE: 21
PIF_VALUE: 24
PIF_VALUE: 21
PIF_VALUE: 20
PIF_VALUE: 19
PIF_VALUE: 21
PIF_VALUE: 19
PIF_VALUE: 21
PIF_VALUE: 21
PIF_VALUE: 2
PIF_VALUE: 19
PIF_VALUE: 21
PIF_VALUE: 3
PIF_VALUE: 24
PIF_VALUE: 13
PIF_VALUE: 21
PIF_VALUE: 12
PIF_VALUE: 19
PIF_VALUE: 21
PIF_VALUE: 22
PIF_VALUE: 19
PIF_VALUE: 21
PIF_VALUE: 3
PIF_VALUE: 21
PIF_VALUE: 21
PIF_VALUE: 24
PIF_VALUE: 19
PIF_VALUE: 19
PIF_VALUE: 20
PIF_VALUE: 19
PIF_VALUE: 21
PIF_VALUE: 20
PIF_VALUE: 21
PIF_VALUE: 19
PIF_VALUE: 12
PIF_VALUE: 18
PIF_VALUE: 20
PIF_VALUE: 21
PIF_VALUE: 19
PIF_VALUE: 3
PIF_VALUE: 20
PIF_VALUE: 20
PIF_VALUE: 21
PIF_VALUE: 3
PIF_VALUE: 21
PIF_VALUE: 24
PIF_VALUE: 16
PIF_VALUE: 21
PIF_VALUE: 21
PIF_VALUE: 19
PIF_VALUE: 19
PIF_VALUE: 20
PIF_VALUE: 4
PIF_VALUE: 18
PIF_VALUE: 23
PIF_VALUE: 6
PIF_VALUE: 21
PIF_VALUE: 19
PIF_VALUE: 22
PIF_VALUE: 20
PIF_VALUE: 19
PIF_VALUE: 21
PIF_VALUE: 20
PIF_VALUE: 20
PIF_VALUE: 19
PIF_VALUE: 20
PIF_VALUE: 20
PIF_VALUE: 1
PIF_VALUE: 19
PIF_VALUE: 22
PIF_VALUE: 3
PIF_VALUE: 3
PIF_VALUE: 19
PIF_VALUE: 28
PIF_VALUE: 20
PIF_VALUE: 22
PIF_VALUE: 22
PIF_VALUE: 14
PIF_VALUE: 21
PIF_VALUE: 22
PIF_VALUE: 23
PIF_VALUE: 20
PIF_VALUE: 15
PIF_VALUE: 18
PIF_VALUE: 20
PIF_VALUE: 18
PIF_VALUE: 21
PIF_VALUE: 3
PIF_VALUE: 15
PIF_VALUE: 21
PIF_VALUE: 20
PIF_VALUE: 21
PIF_VALUE: 16
PIF_VALUE: 21
PIF_VALUE: 21
PIF_VALUE: 20
PIF_VALUE: 21
PIF_VALUE: 18
PIF_VALUE: 12
PIF_VALUE: 21
PIF_VALUE: 20
PIF_VALUE: 14
PIF_VALUE: 18
PIF_VALUE: 19
PIF_VALUE: 21
PIF_VALUE: 11
PIF_VALUE: 21
PIF_VALUE: 21
PIF_VALUE: 20
PIF_VALUE: 20
PIF_VALUE: 21
PIF_VALUE: 20
PIF_VALUE: 21
PIF_VALUE: 20
PIF_VALUE: 21
PIF_VALUE: 20
PIF_VALUE: 21
PIF_VALUE: 12
PIF_VALUE: 21
PIF_VALUE: 19
PIF_VALUE: 21
PIF_VALUE: 22
PIF_VALUE: 23
PIF_VALUE: 20
PIF_VALUE: 20
PIF_VALUE: 21
PIF_VALUE: 20
PIF_VALUE: 18
PIF_VALUE: 12
PIF_VALUE: 23
PIF_VALUE: 19
PIF_VALUE: 21
PIF_VALUE: 20
PIF_VALUE: 19
PIF_VALUE: 21
PIF_VALUE: 2
PIF_VALUE: 20
PIF_VALUE: 12
PIF_VALUE: 19
PIF_VALUE: 21
PIF_VALUE: 21
PIF_VALUE: 4
PIF_VALUE: 21
PIF_VALUE: 21
PIF_VALUE: 19
PIF_VALUE: 20
PIF_VALUE: 21
PIF_VALUE: 19
PIF_VALUE: 21

## 2022-03-21 ASSESSMENT — PAIN DESCRIPTION - PROGRESSION: CLINICAL_PROGRESSION: GRADUALLY WORSENING

## 2022-03-21 ASSESSMENT — PAIN DESCRIPTION - DESCRIPTORS
DESCRIPTORS: ACHING
DESCRIPTORS: NAGGING;ACHING
DESCRIPTORS: SHARP;THROBBING

## 2022-03-21 ASSESSMENT — PAIN DESCRIPTION - LOCATION
LOCATION: ANKLE

## 2022-03-21 ASSESSMENT — PAIN DESCRIPTION - PAIN TYPE
TYPE: ACUTE PAIN
TYPE: ACUTE PAIN
TYPE: SURGICAL PAIN

## 2022-03-21 ASSESSMENT — PAIN DESCRIPTION - ORIENTATION
ORIENTATION: LEFT

## 2022-03-21 ASSESSMENT — PAIN DESCRIPTION - FREQUENCY
FREQUENCY: CONTINUOUS
FREQUENCY: INTERMITTENT

## 2022-03-21 ASSESSMENT — PAIN DESCRIPTION - ONSET: ONSET: GRADUAL

## 2022-03-21 ASSESSMENT — PAIN - FUNCTIONAL ASSESSMENT: PAIN_FUNCTIONAL_ASSESSMENT: PREVENTS OR INTERFERES SOME ACTIVE ACTIVITIES AND ADLS

## 2022-03-21 NOTE — CARE COORDINATION
3/21/22, 10:46 AM EDT  DISCHARGE PLANNING EVALUATION:    Mario Avalos       Admitted: 3/20/2022/ [address] Mahesh day: 1   Location: [facility]16/016-A Reason for admit: DVT prophylaxis [Z29.9]  Fall down stairs, initial encounter Mekhi Galvez  Fall at home, initial encounter [W19. XXXA, Y92.009]  Closed displaced trimalleolar fracture of left ankle, initial encounter Suzan Benavides   PMH:  has a past medical history of Depression. Procedure: Plan OR today. Barriers to Discharge:  New left trimalleolar fracture. Podiatry consult for ORIF. Pain control. PCP: Leonor Parish MD  Readmission Risk Score: 6.5 ( )%    Patient Goals/Plan/Treatment Preferences: Spoke with Jules Schmidt, she plans to return home with . She is borrowing a walker from a friend. Will need a bedside commode. Prefers dme to be delivered to room from [facility] [facility]. Will follow for therapy recommendations post-op. Transportation/Food Security/Housekeeping Addressed:  No issues identified.

## 2022-03-21 NOTE — PROGRESS NOTES
Arrived in OR holding in bed, IVF infusing, family at bedside. Consents obtained and verified, placed in chart. Family contact obtained, directed to OR holding. Bilateral nares swabbed with alcohol. Temp 98. 1.

## 2022-03-21 NOTE — H&P
Review of Systems    Physical Exam       I have reviewed the chart and consent has been signed. Patient agrees with procedding with ORIF of the left trimal ankle fracutre.

## 2022-03-21 NOTE — PROGRESS NOTES
[facility]  OCCUPATIONAL THERAPY MISSED TREATMENT NOTE  STR ORTHOPEDICS 7K  7K-16/016-A      Date: 3/21/2022  Patient Name: Karlee Mccullough        CSN: [id number]   : 1970  (45 y.o.)  Gender: female                REASON FOR MISSED TREATMENT: RN reporting surgery later this date, hold at this time.

## 2022-03-21 NOTE — PROGRESS NOTES
[facility]  INPATIENT PHYSICAL THERAPY  EVALUATION  [facility] ORTHOPEDICS 7K - 7K-16/016-A    Time In: 9515  Time Out: 1118  Timed Code Treatment Minutes: 10 Minutes  Minutes: 20          Date: 3/21/2022  Patient Name: Shyanne Mo,  Gender:  female        MRN: [id number]  : 1970  (45 y.o.)      Referring Practitioner: STEPHANY Gant CNP  Diagnosis: DVT prophylaxis  Additional Pertinent Hx: Per EMR Johnny Hebert is a 45year old female presenting to the Emergency Department via EMS from the [facility] for evaluation of injury after sustaining a fall down 1 step at home. She reports that she was carrying her 7 month old grandchild down the steps this morning and she mis-stepped and fell down one step, rolling her left ankle. She immediately knew that she had broken her left ankle. She was evaluated at the [facility] where she was diagnosed with a displaced trimalleolar fracture. She was transferred to [address] [place] [address] for podiatry and trauma evaluation. The ankle was reduced and a splint was placed. She is being admitted for surgical fixation. \" Planning for ORIF 3/21 in evening for L LE. Restrictions/Precautions:  Restrictions/Precautions: Weight Bearing,General Precautions,Fall Risk  Left Lower Extremity Weight Bearing: Non Weight Bearing  Position Activity Restriction  Other position/activity restrictions: NWB L LE, plans ORIF today 3/21 in evening    Subjective:  Chart Reviewed: Yes  Patient assessed for rehabilitation services?: Yes  Family / Caregiver Present: Yes ()  Subjective: OK to see pt per nursing. Pt in bed when PT arrived, reports reduced pain at this time due to pain meds recently recieved.  Pt agreeable to sit in bedside chair this AM.    General:  Overall Orientation Status: Within Normal Limits  Follows Commands: Within Functional Limits    Vision: Impaired  Vision Exceptions: Wears glasses at all times    Hearing: Within functional limits Pain: 2-3/10: L ankle    Vitals: Vitals not assessed per clinical judgement, see nursing flowsheet    Social/Functional History:    Lives With: Spouse,Daughter  Type of Home: House  Home Layout: One level  Home Access: Stairs to enter with rails  Entrance Stairs - Number of Steps: 2 REJI  Entrance Stairs - Rails: Both  Home Equipment: Crutches (plans to obtain knee scooter)     Bathroom Shower/Tub: Tub/Shower unit  Bathroom Toilet: Standard       ADL Assistance: Independent  Homemaking Assistance: Independent  Ambulation Assistance: Independent  Transfer Assistance: Independent    Active : Yes  Mode of Transportation: SUV  Occupation: Full time employment  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  home at all times to assist as needed. Pt amb with no AD, IND with all mobility tasks prior and IND with ADL and IADL's.     OBJECTIVE:  Range of Motion:  Right Lower Extremity: WNL  Left Lower Extremity: Impaired - did not assess due to splint and pain    Strength:  Right Lower Extremity: WNL  Left Lower Extremity: Impaired - WFL's with hip and knee, did not assess ankle due to pain and splint    Balance:  Static Sitting Balance:  Supervision  Dynamic Sitting Balance: Supervision, Stand By Assistance  Static Standing Balance: Contact Guard Assistance, X 1, with cues for safety    Bed Mobility:  Rolling to Right: Contact Guard Assistance, X 1, with head of bed raised, with rail   Supine to Sit: Minimal Assistance, X 1, with head of bed raised, with rail, with verbal cues   Assist required for L LE  Transfers:  Sit to Stand: [employer], X 1, with verbal cues  Stand to [address] [address], X 1, with verbal cues  Good demo of NWB on the L LE  Ambulation:  Contact Guard Assistance, X 1, with cues for safety, with verbal cues , with increased time for completion  Distance: 3 feet to chair  Surface: Level Tile  Device:Rolling Walker  Gait Deviations:  Slow Hailey, Decreased Step Length on Right, Decreased Gait Speed, Decreased Heel Strike on Right and \"hopping pattern\" on L LE due to NWB, good demo of NWB on L LE. Functional Outcome Measures: Completed  AM-PAC Inpatient Mobility Raw Score : 16  AM-PAC Inpatient T-Scale Score : 40.78    ASSESSMENT:  Activity Tolerance:  Patient tolerance of  treatment: good. Treatment Initiated: Treatment and education initiated within context of evaluation. Evaluation time included review of current medical information, gathering information related to past medical, social and functional history, completion of standardized testing, formal and informal observation of tasks, assessment of data and development of plan of care and goals. Treatment time included skilled education and facilitation of tasks to increase safety and independence with functional mobility for improved independence and quality of life. Assessment: Body structures, Functions, Activity limitations: Decreased functional mobility ,Increased pain,Decreased balance,Decreased strength,Decreased endurance  Assessment: Pt s/p fall at the home, is NWB on L LE. Pt currently has surgery planned for 3/21 in the evening for ORIF of the L LE. Pt cont to require skilled PT services to progress with strength, balance, and functional mobility to progress towards PLOF and return home safely.   Prognosis: Good    REQUIRES PT FOLLOW UP: Yes    Discharge Recommendations:  Discharge Recommendations: Continue to assess pending progress,Patient would benefit from continued therapy after discharge,24 hour supervision or assist    Patient Education:  PT Education: Jona Vasquez. 32 Mobility Training,Transfer Training,Equipment,Precautions  Patient Education: d/c planning    Equipment Recommendations:  Equipment Needed: No (will cont to assess, plans to obtain equipment from family and friends)    Plan:  Times per week: 6x O/T  Specific instructions for Next Treatment: trial knee scooter, stairs  Current Treatment Recommendations: Strengthening,Home Exercise Program,Neuromuscular Re-education,Safety Education & Disha Cochran Training,Patient/Caregiver Education & Training,Functional Mobility Training,Equipment Evaluation, Education, & procurement,Transfer Training,Gait Training,Stair training    Goals:  Patient goals : return home with   Short term goals  Time Frame for Short term goals: By discharge  Short term goal 1: Pt will amb with LRAD for 50 feet with good demo of WB status with S to progress with overall mobility. Short term goal 2: Pt will demo S with transfers with RW for support with good demo of WB status to progress with mobility. Short term goal 3: Pt will demo IND with bed mobility tasks with good safety to progress with overall mobility. Short term goal 4: Pt will demo S for car transfers with good demo of WB status to return home safely. Short term goal 5: Pt will negotiate steps for 3 REJI the home with B rail with SBA for safety to progress with overall mobility. Long term goals  Time Frame for Long term goals : NA due to short ELOS    Following session, patient left in safe position with all fall risk precautions in place. Pt in bedside chair following session, all needs and call light in reach.

## 2022-03-21 NOTE — PLAN OF CARE
Problem: Falls - Risk of:  Goal: Will remain free from falls  Description: Will remain free from falls  Outcome: Ongoing  Note: Pt using call light appropriately to call for assistance with ambulation to the bathroom and to chair. Pt is also compliant with use of non-skid slippers. Pt reports understanding of fall prevention when discussed. Problem: Daily Care:  Goal: Daily care needs are met  Description: Daily care needs are met  Outcome: Ongoing  Note: Participates in ADLs     Problem: Pain:  Goal: Patient's pain/discomfort is manageable  Description: Patient's pain/discomfort is manageable  Outcome: Ongoing  Note: Pt report pain at 4 on scale. Pt states oral/iv medication helping to achieve pain goal of a 4 on scale. Problem: Discharge Planning:  Goal: Patients continuum of care needs are met  Description: Patients continuum of care needs are met  Outcome: Ongoing  Note: Pt plans home at discharge. Care manager and social working helping with discharge needs. Problem: Cardiovascular  Goal: No DVT, peripheral vascular complications  Outcome: Ongoing  Note: Pt without s/s of DVT. SCD,S in place to help prevent development of DVT. Care plan reviewed with patient and spouse. Patient and spouse verbalize understanding of the plan of care and contribute to goal setting.

## 2022-03-21 NOTE — ANESTHESIA PRE PROCEDURE
Department of Anesthesiology  Preprocedure Note       Name:  Mario Avalos   Age:  45 y.o.  :  1970                                          MRN:  [id number]         Date:  3/21/2022      Surgeon: Josiane Ludwig):  Aubrey Bruno DPM    Procedure: Procedure(s):  LEFT ANKLE ORIF    Medications prior to admission:   Prior to Admission medications    Medication Sig Start Date End Date Taking?  Authorizing Provider   rivaroxaban (XARELTO) 10 MG TABS tablet Take 1 tablet by mouth daily (with breakfast) 3/20/22 3/20/22  Cristiano Monk DPM   escitalopram (LEXAPRO) 10 MG tablet TAKE 1 TABLET BY MOUTH DAILY 21   Leonor Parish MD   omeprazole (PRILOSEC) 40 MG delayed release capsule TAKE 1 CAPSULE BY MOUTH ONCE DAILY IN THE MORNING BEFORE BREAKFAST 21   Leonor Parish MD   Multiple Vitamins-Minerals (MULTIVITAMIN ADULT PO) Take by mouth    Historical Provider, MD       Current medications:    Current Facility-Administered Medications   Medication Dose Route Frequency Provider Last Rate Last Admin    escitalopram (LEXAPRO) tablet 10 mg  10 mg Oral Daily Talia Gagnon DPM   10 mg at 22 1323    sodium chloride flush 0.9 % injection 5-40 mL  5-40 mL IntraVENous 2 times per day Ronald Eyad, APRN - CNP        sodium chloride flush 0.9 % injection 5-40 mL  5-40 mL IntraVENous PRN Ronald Eyad, APRN - CNP        0.9 % sodium chloride infusion  25 mL IntraVENous PRN Ronald Eyad, APRN - CNP        ondansetron (ZOFRAN-ODT) disintegrating tablet 4 mg  4 mg Oral Q8H PRN Ronald Eyad, APRN - CNP        Or    ondansetron (ZOFRAN) injection 4 mg  4 mg IntraVENous Q6H PRN Ronald Eyad, APRN - CNP        polyethylene glycol (GLYCOLAX) packet 17 g  17 g Oral Daily Ronald Eyad, APRN - CNP        fleet rectal enema 1 enema  1 enema Rectal Daily PRN Ronald Eyad, APRN - CNP        0.9 % sodium chloride infusion   IntraVENous Continuous Ronald Eyad, APRN -  mL/hr at 22 9006 NoRateChange at 03/21/22 1719    acetaminophen (TYLENOL) tablet 650 mg  650 mg Oral Q4H PRN Humera Bering, APRN - CNP        morphine (PF) injection 2 mg  2 mg IntraVENous Q2H PRN Humera Bering, APRN - CNP   2 mg at 03/21/22 8954    Or    morphine injection 4 mg  4 mg IntraVENous Q2H PRN Humera Bering, APRN - CNP   4 mg at 03/21/22 1628    famotidine (PEPCID) tablet 20 mg  20 mg Oral BID Humera Bering, APRN - CNP   20 mg at 03/21/22 0809    HYDROcodone-acetaminophen (Ilean ) 5-325 MG per tablet 1 tablet  1 tablet Oral Q4H PRN Humera Bering, APRN - CNP   1 tablet at 03/20/22 1415    Or    HYDROcodone-acetaminophen (NORCO) 5-325 MG per tablet 2 tablet  2 tablet Oral Q4H PRN Humera Bering, APRN - CNP   2 tablet at 03/21/22 1222    enoxaparin (LOVENOX) injection 40 mg  40 mg SubCUTAneous Daily Norval Kayser, DPM         Facility-Administered Medications Ordered in Other Encounters   Medication Dose Route Frequency Provider Last Rate Last Admin    fentaNYL (SUBLIMAZE) injection   IntraVENous PRN Marrion Needs, APRN - CRNA   100 mcg at 03/21/22 1719    midazolam (VERSED) injection   IntraVENous PRN Marrion Needs, APRN - CRNA   2 mg at 03/21/22 1716    lidocaine (cardiac) (XYLOCAINE) injection   IntraVENous PRN Marrion Needs, APRN - CRNA   100 mg at 03/21/22 1722    propofol injection   IntraVENous PRN Marrion Needs, APRN - CRNA   150 mg at 03/21/22 1722    rocuronium (ZEMURON) injection   IntraVENous PRN Marrion Needs, APRN - CRNA   50 mg at 03/21/22 1722    ceFAZolin (ANCEF) injection   IntraVENous PRN Marrion Needs, APRN - CRNA   2,000 mg at 03/21/22 1726    dexamethasone (PF) (DECADRON) injection   IntraVENous PRN Marrion Needs, APRN - CRNA   8 mg at 03/21/22 1734    phenylephrine (REGINALD-SYNEPHRINE) injection   IntraVENous PRN Marrion Needs, APRN - CRNA   200 mcg at 03/21/22 1740       Allergies:  No Known Allergies    Problem List:    Patient Active Problem List   Diagnosis Code    CKD (chronic kidney disease), stage II N18.2    Depression with anxiety F41.8    Fall at home, initial encounter W19. Tyson Larkin, Y92.009    Closed trimalleolar fracture of left ankle S82.818I       Past Medical History:        Diagnosis Date    Depression        Past Surgical History:        Procedure Laterality Date    GASTRIC BAND      TONSILLECTOMY      TUBAL LIGATION         Social History:    Social History     Tobacco Use    Smoking status: Never Smoker    Smokeless tobacco: Never Used   Substance Use Topics    Alcohol use: Yes                                Counseling given: Not Answered      Vital Signs (Current):   Vitals:    03/21/22 0330 03/21/22 0809 03/21/22 1130 03/21/22 1545   BP: 119/75 136/70 113/66 133/81   Pulse: 86 85 75 74   Resp: 16 18 18 18   Temp: 97.9 °F (36.6 °C) 98.1 °F (36.7 °C) 97.9 °F (36.6 °C) 97.9 °F (36.6 °C)   TempSrc: Oral Oral Oral Oral   SpO2: 93% 95% 90% 95%   Weight:       Height:                                                  BP Readings from Last 3 Encounters:   03/21/22 133/81   03/21/22 (!) 87/55   01/18/22 116/78       NPO Status:                                                                                 BMI:   Wt Readings from Last 3 Encounters:   03/20/22 220 lb 0.3 oz (99.8 kg)   01/18/22 216 lb (98 kg)   09/07/21 212 lb (96.2 kg)     Body mass index is 35.51 kg/m².     CBC:   Lab Results   Component Value Date    WBC 8.1 03/21/2022    RBC 4.09 03/21/2022    HGB 12.3 03/21/2022    HCT 39.3 03/21/2022    MCV 96.1 03/21/2022    RDW 13.1 03/20/2022     03/21/2022       CMP:   Lab Results   Component Value Date     03/21/2022    K 4.7 03/21/2022     03/21/2022    CO2 27 03/21/2022    BUN 9 03/21/2022    CREATININE 0.7 03/21/2022    LABGLOM 87 03/21/2022    GLUCOSE 104 03/21/2022    PROT 5.4 03/21/2022    CALCIUM 8.6 03/21/2022    BILITOT 0.3 03/21/2022    ALKPHOS 54 03/21/2022    AST 16 03/21/2022    ALT 11 03/21/2022 POC Tests: No results for input(s): POCGLU, POCNA, POCK, POCCL, POCBUN, POCHEMO, POCHCT in the last 72 hours. Coags: No results found for: PROTIME, INR, APTT    HCG (If Applicable): No results found for: PREGTESTUR, PREGSERUM, HCG, HCGQUANT     ABGs: No results found for: PHART, PO2ART, PUH4IQC, OAD5XKY, BEART, D5ZAFYSJ     Type & Screen (If Applicable):  No results found for: LABABO, LABRH    Drug/Infectious Status (If Applicable):  No results found for: HIV, HEPCAB    COVID-19 Screening (If Applicable):   Lab Results   Component Value Date    COVID19 Positive 01/18/2022           Anesthesia Evaluation   no history of anesthetic complications:   Airway: Mallampati: II  TM distance: >3 FB   Neck ROM: full  Mouth opening: > = 3 FB Dental: normal exam         Pulmonary:normal exam        (-) COPD and asthma                           Cardiovascular:Negative CV ROS  Exercise tolerance: good (>4 METS),       (-) hypertension, past MI and CAD                Neuro/Psych:   (+) psychiatric history: stable with treatmentdepression/anxiety    (-) seizures and CVA           GI/Hepatic/Renal:   (+) GERD: well controlled,      (-) liver disease and no renal disease       Endo/Other:        (-) diabetes mellitus, hypothyroidism, hyperthyroidism               Abdominal:   (+) obese,           Vascular:     - DVT. Other Findings:             Anesthesia Plan      general and regional     ASA 2     (GETA. PIV. Additional access can be obtained after induction if needed. Standard ASA monitors. IV/PO opioids and other adjuncts as needed for pain control. PACU post op for recovery.     Possible anesthetics complications were discussed with the patient, including but not limited to: PONV, damage to the airway and surrounding structures (teeth, lips, gums, tongue, etc.), adverse reactions to medicine, cardiac complications (MI, CHF, arrhythmias, etc.), respiratory complications (post-op ventilation, respiratory failure, etc.), neurologic complications (nerve damage, stroke, seizure), and death. The patient was given the opportunity to ask questions and all questions were answered to the patient's satisfaction. The patient is in agreement with the anesthetic plan.  )  Induction: intravenous. Anesthetic plan and risks discussed with patient and spouse. Plan discussed with CRNA.                   Sangeeta Delarosa DO   3/21/2022

## 2022-03-22 PROCEDURE — 97535 SELF CARE MNGMENT TRAINING: CPT

## 2022-03-22 PROCEDURE — 97530 THERAPEUTIC ACTIVITIES: CPT

## 2022-03-22 PROCEDURE — 6360000002 HC RX W HCPCS: Performed by: STUDENT IN AN ORGANIZED HEALTH CARE EDUCATION/TRAINING PROGRAM

## 2022-03-22 PROCEDURE — 2580000003 HC RX 258: Performed by: STUDENT IN AN ORGANIZED HEALTH CARE EDUCATION/TRAINING PROGRAM

## 2022-03-22 PROCEDURE — 6370000000 HC RX 637 (ALT 250 FOR IP): Performed by: STUDENT IN AN ORGANIZED HEALTH CARE EDUCATION/TRAINING PROGRAM

## 2022-03-22 PROCEDURE — 97116 GAIT TRAINING THERAPY: CPT

## 2022-03-22 PROCEDURE — 97166 OT EVAL MOD COMPLEX 45 MIN: CPT

## 2022-03-22 PROCEDURE — 1200000000 HC SEMI PRIVATE

## 2022-03-22 PROCEDURE — 2500000003 HC RX 250 WO HCPCS: Performed by: STUDENT IN AN ORGANIZED HEALTH CARE EDUCATION/TRAINING PROGRAM

## 2022-03-22 RX ORDER — ROPIVACAINE HYDROCHLORIDE 5 MG/ML
INJECTION, SOLUTION EPIDURAL; INFILTRATION; PERINEURAL
Status: DISCONTINUED | OUTPATIENT
Start: 2022-03-21 | End: 2022-03-22 | Stop reason: SDUPTHER

## 2022-03-22 RX ADMIN — ESCITALOPRAM OXALATE 10 MG: 10 TABLET ORAL at 08:04

## 2022-03-22 RX ADMIN — ENOXAPARIN SODIUM 40 MG: 100 INJECTION SUBCUTANEOUS at 16:26

## 2022-03-22 RX ADMIN — HYDROCODONE BITARTRATE AND ACETAMINOPHEN 2 TABLET: 5; 325 TABLET ORAL at 20:41

## 2022-03-22 RX ADMIN — SODIUM CHLORIDE, PRESERVATIVE FREE 10 ML: 5 INJECTION INTRAVENOUS at 20:42

## 2022-03-22 RX ADMIN — POLYETHYLENE GLYCOL 3350 17 G: 17 POWDER, FOR SOLUTION ORAL at 08:04

## 2022-03-22 RX ADMIN — HYDROCODONE BITARTRATE AND ACETAMINOPHEN 2 TABLET: 5; 325 TABLET ORAL at 11:10

## 2022-03-22 RX ADMIN — SODIUM CHLORIDE: 9 INJECTION, SOLUTION INTRAVENOUS at 06:37

## 2022-03-22 RX ADMIN — FAMOTIDINE 20 MG: 20 TABLET ORAL at 08:03

## 2022-03-22 RX ADMIN — CEFAZOLIN 2000 MG: 10 INJECTION, POWDER, FOR SOLUTION INTRAVENOUS at 00:01

## 2022-03-22 RX ADMIN — MORPHINE SULFATE 4 MG: 4 INJECTION, SOLUTION INTRAMUSCULAR; INTRAVENOUS at 13:54

## 2022-03-22 RX ADMIN — HYDROCODONE BITARTRATE AND ACETAMINOPHEN 2 TABLET: 5; 325 TABLET ORAL at 06:40

## 2022-03-22 RX ADMIN — MORPHINE SULFATE 2 MG: 2 INJECTION, SOLUTION INTRAMUSCULAR; INTRAVENOUS at 17:53

## 2022-03-22 RX ADMIN — CEFAZOLIN 2000 MG: 10 INJECTION, POWDER, FOR SOLUTION INTRAVENOUS at 08:10

## 2022-03-22 RX ADMIN — FAMOTIDINE 20 MG: 20 TABLET ORAL at 20:42

## 2022-03-22 RX ADMIN — MORPHINE SULFATE 4 MG: 4 INJECTION, SOLUTION INTRAMUSCULAR; INTRAVENOUS at 21:45

## 2022-03-22 RX ADMIN — HYDROCODONE BITARTRATE AND ACETAMINOPHEN 1 TABLET: 5; 325 TABLET ORAL at 01:36

## 2022-03-22 RX ADMIN — MORPHINE SULFATE 2 MG: 2 INJECTION, SOLUTION INTRAMUSCULAR; INTRAVENOUS at 08:20

## 2022-03-22 RX ADMIN — FAMOTIDINE 20 MG: 20 TABLET ORAL at 16:26

## 2022-03-22 RX ADMIN — HYDROCODONE BITARTRATE AND ACETAMINOPHEN 2 TABLET: 5; 325 TABLET ORAL at 16:23

## 2022-03-22 ASSESSMENT — PAIN DESCRIPTION - FREQUENCY: FREQUENCY: INTERMITTENT

## 2022-03-22 ASSESSMENT — PAIN SCALES - GENERAL
PAINLEVEL_OUTOF10: 4
PAINLEVEL_OUTOF10: 6
PAINLEVEL_OUTOF10: 8
PAINLEVEL_OUTOF10: 7
PAINLEVEL_OUTOF10: 10
PAINLEVEL_OUTOF10: 8
PAINLEVEL_OUTOF10: 3
PAINLEVEL_OUTOF10: 0
PAINLEVEL_OUTOF10: 6
PAINLEVEL_OUTOF10: 4
PAINLEVEL_OUTOF10: 0
PAINLEVEL_OUTOF10: 7
PAINLEVEL_OUTOF10: 3
PAINLEVEL_OUTOF10: 7
PAINLEVEL_OUTOF10: 9

## 2022-03-22 ASSESSMENT — PAIN DESCRIPTION - LOCATION
LOCATION: ANKLE
LOCATION: ANKLE

## 2022-03-22 ASSESSMENT — PAIN - FUNCTIONAL ASSESSMENT: PAIN_FUNCTIONAL_ASSESSMENT: PREVENTS OR INTERFERES SOME ACTIVE ACTIVITIES AND ADLS

## 2022-03-22 ASSESSMENT — PAIN DESCRIPTION - ORIENTATION
ORIENTATION: LEFT
ORIENTATION: LEFT

## 2022-03-22 ASSESSMENT — PAIN DESCRIPTION - PROGRESSION: CLINICAL_PROGRESSION: GRADUALLY IMPROVING

## 2022-03-22 ASSESSMENT — PAIN DESCRIPTION - PAIN TYPE
TYPE: SURGICAL PAIN
TYPE: SURGICAL PAIN

## 2022-03-22 ASSESSMENT — PAIN DESCRIPTION - DESCRIPTORS: DESCRIPTORS: SHARP

## 2022-03-22 ASSESSMENT — PAIN DESCRIPTION - ONSET: ONSET: ON-GOING

## 2022-03-22 NOTE — BRIEF OP NOTE
Brief Postoperative Note      Patient: Rajiv Goldstein  YOB: 1970  MRN: [id number]    Date of Procedure: 3/21/2022    Pre-Op Diagnosis: Left ankle fracture    Post-Op Diagnosis: Same       Procedure(s):  LEFT ANKLE ORIF with trimalleolar fixation    Surgeon(s):  Ace Arriaga DPM    Assistant:  Resident: Janice Del Rosario DPM    Anesthesia: General    Estimated Blood Loss (mL): less than 50     Complications: None    Hemostasis: Thigh tourniquet 300 mmHg    Materials: 2-0 Vicryl 3-0 Monocryl, zip line    Injectables: None    Specimens:   * No specimens in log *    Implants:  Implant Name Type Inv. Item Serial No.  Lot No. LRB No. Used Action   PLATE BONE [id number] 7 H STRL LT POSTEROLATERAL DSTL FIBULAR S - [id number]  PLATE BONE [id number] 7 H STRL LT POSTEROLATERAL DSTL FIBULAR S  [facility]  Left 1 Implanted   PLATE BONE [id number] THK1. 4MM SHFT W7.5MM HD 21.5MM 5 [place] - [id number]  PLATE BONE H56ND THK1. 4MM SHFT W7.5MM HD 21.5MM 5 H STRL Y  [place] [facility]  Left 1 Implanted   SCREW BONE L12MM DIA3. 5MM STRL S STL LCK ST FOR SM PLATING - [id number]  SCREW BONE L12MM DIA3. 5MM STRL S STL LCK ST FOR SM PLATING  [facility]  Left 6 Implanted   K WIRE FIX L150MM DIA1. 6MM TRCR PNT FOR JASON-LOC RADHA ANG - [id number]  K WIRE FIX L150MM DIA1. 6MM TRCR PNT FOR JASON-LOC RADHA ANG  [place] [employer] [facility]  Left 2 Explanted   SCREW BONE L20MM THRD DIA2.7MM HD DIA4. 3MM COR DIA2MM PITCH - [id number]  SCREW BONE L20MM THRD DIA2.7MM HD DIA4. 3MM COR DIA2MM PITCH  [facility]  Left 1 Implanted   SCREW BONE L18MM THRD DIA2.7MM HD DIA4. 3MM COR DIA2MM PITCH - [id number]  SCREW BONE L18MM THRD DIA2.7MM HD DIA4. 3MM COR DIA2MM PITCH  [employer] [facility]  Left 4 Implanted   SCREW BONE L14MM THRD DIA2.7MM HD DIA4. 3MM COR DIA2MM PITCH - [id number]  SCREW BONE L14MM THRD DIA2.7MM HD DIA4. 3MM COR DIA2MM PITCH  [employer] AND Count includes the [facility]-  Left 1 Implanted   SCREW BNE L16MM THRD [id number] COR DIA2MM PITCH - [id number]  SCREW BNE L16MM THRD [id number] COR DIA2MM PITCH  [place] AND Count includes the [facility]THOPA[facility]-  Left 1 Implanted   SCREW BONE L70MM DIA4MM KESHA FULL THRD - [id number]  SCREW BONE L70MM DIA4MM KESHA FULL THRD  [facility]THOPAEDICS-  Left 1 Implanted         Drains: * No LDAs found *    Findings: Talar cartilage defect, medial shoulder of the talus    Electronically signed by Olinda Chavez DPM on 3/21/2022 at 9:04 PM

## 2022-03-22 NOTE — PROGRESS NOTES
2115 Awake and oriented on arrival to PACU , [facility] elevated   2125 pt c/o # 5 Lt ankle pain   2121 medicated with 1 Norco tab   2135 eyes closed resp easy   2155 awakens easily to name , states pain tolerable denies any other needs

## 2022-03-22 NOTE — PROGRESS NOTES
Patient arrived back to room from surgery. Patient is alert and oriented with no complaints of pain at this time. Will continue to monitor and follow plan of care.

## 2022-03-22 NOTE — PROGRESS NOTES
[facility] 58  INPATIENT OCCUPATIONAL THERAPY  [facility] ORTHOP[facility] 7K  EVALUATION    Time:   Time In: 1709  Time Out: 0840  Timed Code Treatment Minutes: 24 Minutes  Minutes: 32          Date: 3/22/2022  Patient Name: Ced Razo,   Gender: female      MRN: [id number]  : 1970  (45 y.o.)  Referring Practitioner: Lisa Wilde DPM  Diagnosis: DVT prophylaxis  Additional Pertinent Hx: Pt is a 45year old female presenting to the Emergency Department via EMS from the [facility] for evaluation of injury after sustaining a fall down 1 step at home. She reports that she was carrying her 7 month old grandchild down the steps this morning and she mis-stepped and fell down one step, rolling her left ankle. She immediately knew that she had broken her left ankle. She was evaluated at the [facility] where she was diagnosed with a displaced trimalleolar fracture. She was transferred to [address] [place] [address] for podiatry and trauma evaluation. The ankle was reduced and a splint was placed. She is being admitted for surgical fixation. ORIF 3/21 for L LE    Restrictions/Precautions:  Restrictions/Precautions: Weight Bearing,General Precautions,Fall Risk  Left Lower Extremity Weight Bearing: Non Weight Bearing  Position Activity Restriction  Other position/activity restrictions: NWB L LE, L LE ORIF 3/21    Subjective  Chart Reviewed: Shekhar Jones and Physical  Patient assessed for rehabilitation services?: Yes  Family / Caregiver Present: No    Subjective: OK to see Pt per RN. Pt resting in bed upon arrival, agreeable to OT session. Increased time discussing equipment and discharge recommendations. Discussed differences between toilet raiser and BSC, and shower chair vs tub transfer bench. Pt understanding of recommendations of BSC and tub transfer bench without further questions.     Pain:  Pain Assessment  Patient Currently in Pain: Yes  Pain Assessment: 0-10  Pain Level: 4    Vitals: Vitals not assessed per clinical judgement, see nursing flowsheet    Social/Functional History:  Lives With: Spouse,Daughter  Type of Home: House  Home Layout: One level,Performs ADL's on one level,Able to Live on Main level with bedroom/bathroom  Home Access: Stairs to enter with rails  Entrance Stairs - Number of Steps: 1  Entrance Stairs - Rails: Left  Home Equipment: Crutches   Bathroom Shower/Tub: Tub/Shower unit  Bathroom Toilet: Standard       ADL Assistance: Independent  Homemaking Assistance: Independent  Ambulation Assistance: Independent  Transfer Assistance: Independent    Active : Yes  Mode of Transportation: [employer]  Occupation: Full time employment  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  home at all times to assist as needed. Pt amb with no AD, IND with all mobility tasks prior and IND with ADL and IADL's PTA.    VISION:Corrected    HEARING:  WFL    COGNITION: WFL    RANGE OF MOTION:  Bilateral Upper Extremity:  WFL    STRENGTH:  Bilateral Upper Extremity:  WFL      ADL:   Grooming: Contact Guard Assistance for stability completing oral care standing at sink and with increased time for completion. Pt able to maintain NWB LLE  Toileting: Supervision to [address] for stability when Pt completing hygiene management and with increased time for completion. Pt able to maintain NWB LLE  Toilet Transfer: [address] BALANCE:  Sitting Balance:  Supervision. Standing Balance: Contact Guard Assistance, with increased time for completion. Pt able to tolerate standing ~2 minutes sinkside to complete oral care while maintaining NWB LLE. Required sitting rest break before returning to EOB.     BED MOBILITY:  Supine to Sit: Minimal Assistance to bring LLE out of bed, with rail, with increased time for completion    Sit to Supine: Stand By Assistance, with rail, with increased time for completion      TRANSFERS:  Sit to Stand:  [address], with increased time for completion. Stand to Sit: [address], with increased time for completion. FUNCTIONAL MOBILITY:  Assistive Device: Rolling Walker  Assist Level:  Contact Guard Assistance. Distance: To and from bathroom    Pt tolerated well and able to maintain NWB LLE status without any cues throughout session. Pt able to walk 3X. From EOB to BSC, BSC to door back to EOB, and then EOB to bathroom and back. Activity Tolerance:  Patient tolerance of  treatment: good. Assessment:  Assessment: Pt requires increased assistance with ADLs and IADLs compared to PLOF. Pt would benefit from skilled OT to maximize safety and independence required to return to desired environment  Performance deficits / Impairments: Decreased functional mobility ,Decreased ADL status,Decreased high-level IADLs,Decreased endurance  Prognosis: Good  REQUIRES OT FOLLOW UP: Yes  Decision Making: Medium Complexity    Treatment Initiated: Treatment and education initiated within context of evaluation. Evaluation time included review of current medical information, gathering information related to past medical, social and functional history, completion of standardized testing, formal and informal observation of tasks, assessment of data and development of plan of care and goals. Treatment time included skilled education and facilitation of tasks to increase safety and independence with ADL's for improved functional independence and quality of life. Discharge Recommendations:  24 hour supervision or assist    Patient Education:  OT Education: OT Role,Plan of Care,Family Education,Transfer Training,Energy Conservation (functional mobility)    Equipment Recommendations:  Equipment Needed: Yes  Mobility Devices: [employer]  ADL Assistive Devices:  Toileting - Standard Commode,Transfer Tub Bench    Plan:  Times per week: 5x  Times per day: Daily  Current Treatment Recommendations: Strengthening,Functional Mobility Training,Endurance Training,Safety Education & Training,Self-Care / ADL,Equipment Evaluation, Education, & procurement,Patient/Caregiver Education & Training. See long-term goal time frame for expected duration of plan of care. If no long-term goals established, a short length of stay is anticipated. Goals:  Patient goals : return home  Short term goals  Time Frame for Short term goals: by discharge  Short term goal 1: Pt will complete functional t/fs S in prep for toileting tasks  Short term goal 2: Pt will complete LB ADLs S for dressing and bathing tasks  Short term goal 3: Pt will complete functional mobility HH distances S in prep for ADLs         Following session, patient left in safe position with all fall risk precautions in place.

## 2022-03-22 NOTE — ANESTHESIA PROCEDURE NOTES
Peripheral Block    Patient location during procedure: OR  Start time: 3/21/2022 9:23 PM  End time: 3/21/2022 9:31 PM  Staffing  Performed: anesthesiologist   Anesthesiologist: Melodie Walter,   Preanesthetic Checklist  Completed: patient identified, IV checked, site marked, risks and benefits discussed, surgical consent, monitors and equipment checked, pre-op evaluation, timeout performed, anesthesia consent given, oxygen available and patient being monitored  Peripheral Block  Patient position: supine  Prep: ChloraPrep  Patient monitoring: cardiac monitor, continuous pulse ox, frequent blood pressure checks, IV access and continuous capnometry  Block type: Sciatic  Laterality: left  Injection technique: single-shot  Guidance: ultrasound guided  Popliteal  Provider prep: mask and sterile gloves  Needle  Needle type: combined needle/nerve stimulator   Needle gauge: 20 G  Needle length: 10 cm  Needle localization: ultrasound guidance  Assessment  Injection assessment: negative aspiration for heme, no paresthesia on injection and local visualized surrounding nerve on ultrasound  Paresthesia pain: none  Slow fractionated injection: yes  Hemodynamics: stable  Medications Administered  Ropivacaine (NAROPIN) injection 0.5%, 30 mL  Reason for block: post-op pain management

## 2022-03-22 NOTE — PROGRESS NOTES
[address] [address]  INPATIENT PHYSICAL THERAPY  DAILY NOTE  [facility] ORTHOPEDICS 7K - 7K-16/016-A  Time In: 9113  Time Out: 1426  Timed Code Treatment Minutes: 29 Minutes  Minutes: 29          Date: 3/22/2022  Patient Name: Willie Vargas,  Gender:  female        MRN: [id number]  : 1970  (45 y.o.)     Referring Practitioner: STEPHANY Poon CNP  Diagnosis: DVT prophylaxis  Additional Pertinent Hx: Per EMR Jeyson Wilkins is a 45year old female presenting to the Emergency Department via EMS from the [facility] for evaluation of injury after sustaining a fall down 1 step at home. She reports that she was carrying her 7 month old grandchild down the steps this morning and she mis-stepped and fell down one step, rolling her left ankle. She immediately knew that she had broken her left ankle. She was evaluated at the [facility] where she was diagnosed with a displaced trimalleolar fracture. She was transferred to [address] [place] [address] for podiatry and trauma evaluation. The ankle was reduced and a splint was placed. She is being admitted for surgical fixation. \" Pt is s/p Open reduction and internal fixation, left trimalleolar anklefracture with posterior lip fixation completed by Dr. Karl Parikh on 3/21. Prior Level of Function:  Lives With: Spouse,Daughter  Type of Home: House  Home Layout: One level,Performs ADL's on one level,Able to Live on Main level with bedroom/bathroom  Home Access: Stairs to enter with rails  Entrance Stairs - Number of Steps: 1  Entrance Stairs - Rails: Left  Home Equipment: Crutches   Bathroom Shower/Tub: Tub/Shower unit  Bathroom Toilet: Standard    ADL Assistance: Independent  Homemaking Assistance: Independent  Ambulation Assistance: Independent  Transfer Assistance: Independent  Active : Yes  Additional Comments: Plans to obtain walker, shower chair, knee scooter and other equipment from friends and family.  home at all times to assist as needed.  Pt amb with no AD, IND with all mobility tasks prior and IND with ADL and IADL's PTA. Restrictions/Precautions:  Restrictions/Precautions: Weight Bearing,General Precautions,Fall Risk  Left Lower Extremity Weight Bearing: Non Weight Bearing  Position Activity Restriction  Other position/activity restrictions: NWB L LE     SUBJECTIVE: OK to see pt per nursing. Pt in bed when PT arrived, pleasant and agreeable to PT session, wanting to trial knee scooter and crutches this date as well as stairs for safe return home.      PAIN: mod pain reported, did not quantify    Vitals: Vitals not assessed per clinical judgement, see nursing flowsheet    OBJECTIVE:  Bed Mobility:  Supine to Sit: Minimal Assistance, X 1, with head of bed raised, with rail, with verbal cues   Sit to Supine: Stand By Assistance, X 1, with head of bed raised   Assist required for L LE to sit on EOB  Transfers:  Sit to Stand: [employer], X 1, cues for hand placement, with verbal cues  Stand to Sit:Stand By Assistance, [employer] Assistance, X 1, with verbal cues  Good demo of NWB on the L LE, required R crutch to help with placement on knee scooter  Ambulation:  Stand By Assistance, [employer] Assistance, X 1, with cues for safety, with verbal cues   Distance: 280 feet, 12 feet  Surface: Level Tile  Device:knee scooter , and then 12 feet with crutches  Gait Deviations:  Decreased Step Length on Right, Decreased Gait Speed and Decreased Heel Strike on Right  \"hopping\" pattern on crutches, good demo of NWB in the L LE  Balance:  Static Sitting Balance:  Supervision  Dynamic Sitting Balance: Supervision  Static Standing Balance: Stand By Assistance, Contact Guard Assistance, X 1  Use of crutches for support as needed  Stairs:  Contact Guard Assistance, Minimal Assistance, X 1, with cues for safety, with verbal cues , with increased time for completion  Number of Steps: 2  Height: 6\" step with R rail with ascend and L crutch, L rail with descend and R crutch   Good demo of NWB on L LE, no LOB noted with completion    Functional Outcome Measures: Completed  AM-PAC Inpatient Mobility Raw Score : 18  AM-PAC Inpatient T-Scale Score : 43.63    ASSESSMENT:  Assessment: Patient progressing toward established goals. and Pt good demo with WB status all throughout session. No LOB noted  Activity Tolerance:  Patient tolerance of  treatment: good. Equipment Recommendations:Equipment Needed: No (will cont to assess, plans to obtain equipment from family and friends)  Discharge Recommendations: 24 hour assistance or supervision  Plan: Times per week: 6x O/T  Specific instructions for Next Treatment: trial knee scooter, stairs  Current Treatment Recommendations: Strengthening,Home Exercise Program,Neuromuscular Re-education,Safety Education & [place] Training,Patient/Caregiver Education & Training,Functional Mobility Training,Equipment Evaluation, Education, & procurement,Transfer Training,Gait Training,Stair training    Patient Education  Patient Education: Plan of Care, [employer] Mobility, Equipment Education, Transfers, Gait, Stairs, Use of Gait Belt, Home Safety Education,  - Patient Verbalized Understanding, - Patient Requires Continued Education    Goals:  Patient goals : return home with   Short term goals  Time Frame for Short term goals: By discharge  Short term goal 1: Pt will amb with LRAD for 50 feet with good demo of WB status with S to progress with overall mobility. Short term goal 2: Pt will demo S with transfers with RW for support with good demo of WB status to progress with mobility. Short term goal 3: Pt will demo IND with bed mobility tasks with good safety to progress with overall mobility. Short term goal 4: Pt will demo S for car transfers with good demo of WB status to return home safely.   Short term goal 5: Pt will negotiate steps for 3 REJI the home with B rail with SBA for safety to progress with overall mobility. Long term goals  Time Frame for Long term goals : NA due to short ELOS    Following session, patient left in safe position with all fall risk precautions in place. Pt requesting to get back in bed following session with ice packs applied.

## 2022-03-22 NOTE — PLAN OF CARE
Problem: Falls - Risk of:  Goal: Will remain free from falls  Description: Will remain free from falls  Outcome: Ongoing  Note: Patient up with staff assistance. Able to use call light. Patient has remained free of falls during this shift. Appropriate fall prevention measures in place. Problem: Daily Care:  Goal: Daily care needs are met  Description: Daily care needs are met  Outcome: Ongoing  Note: Patient care needs are met as they arise. Patient able to use call light when needing assistance. Problem: Pain:  Goal: Patient's pain/discomfort is manageable  Description: Patient's pain/discomfort is manageable  Outcome: Ongoing  Note: Patient taking pain medication on MAR as needed to control pain. Use of non-pharmacologic pain management including ice/repositioning. Patient pain goal is 3/10. Problem: Discharge Planning:  Goal: Patients continuum of care needs are met  Description: Patients continuum of care needs are met  Outcome: Ongoing  Note: Patient plans to return home with family. Pt receiving equipment for d/c     Problem: Cardiovascular  Goal: No DVT, peripheral vascular complications  Outcome: Ongoing  Note: Pt receiving lovenox for dvt prophylaxis     Problem: Mobility - Impaired:  Goal: Mobility will improve  Description: Mobility will improve  Outcome: Ongoing  Note: Pt tolerates ambulating with walker, maintains non WB on left leg. Pt also uses knee scooter     Problem: DISCHARGE BARRIERS  Goal: Patient's continuum of care needs are met  Outcome: Ongoing  Note: Discharge planning is in progress      Care plan reviewed with patient. Patient verbalize understanding of the plan of care and contribute to goal setting.

## 2022-03-22 NOTE — OP NOTE
[address] [place] [address]                                OPERATIVE REPORT    PATIENT NAME: Sushila Hobbs                    :        1970  MED REC NO:   [id number]                           ROOM:       0016  ACCOUNT NO:   [de-identified]                           ADMIT DATE: 2022  PROVIDER:     Darius Pickett D.P.M. DATE OF PROCEDURE:  2022    SURGEON:  Chester Borges DPM    ASSISTANT:  Travis Monk DPM    PREOPERATIVE DIAGNOSIS:  Displaced left trimalleolar ankle fracture,  code SA2.852A. POSTOPERATIVE DIAGNOSIS:  Displaced left trimalleolar ankle fracture,  code SA2.852A. PROCEDURES:  1. Open reduction and internal fixation, left trimalleolar ankle  fracture with posterior lip fixation, code 88381.  2.  Use of fluoroscopy, code 55876. ANESTHESIA:  See Anesthesia record. HEMOSTASIS:  Left thigh tourniquet at 300 for 120 minutes. Tourniquet  was down for 20 minutes, then re-inflated for another 44 minutes at 300. EBL:  Less than 10 mL. MATERIALS:  [employer] 7-hole antiglide plate with 3.5 locking  screws for the fibula, [employer] EVOS MINI set Y plate with 2.7  locking screws x6, one 4.0 cannulated screw 70 mm in length for the  medial malleolus. INJECTABLES:  None. COMPLICATIONS:  None. OPERATIVE PROCEDURE:  After properly being identified in the holding  area, the patient was transported to the operating room. On the gurney,  the patient was intubated. Tourniquet was placed around the patient's  left thigh. The patient was then placed prone on the operating room  table with axillary rolls as per Anesthesia's satisfaction. Extremities  were also padded off as per Anesthesia's satisfaction. The left foot,  ankle, and leg were then prepped and draped to the level of the knee. A  timeout was taken.   We confirmed we had the correct patient operating  the correct left lower extremity. Attention was directed at the interval between the Achilles tendon and  peroneal tendon in the posterolateral leg. 14-cm incision was made from  the distal level of the ankle extending proximally. Blunt subcutaneous  dissection was performed. Sural nerve and sural vein, lesser saphenous  vein were also identified and freed up and retracted out of harm's way  during the case. These structures were not injured during the case. Blunt subcutaneous dissection performed down to the level of the deep  fascia between the FHL tendon and peroneal muscle belly. FHL muscle  belly and peroneal muscle bellies were identified. Deep fascia was  carefully incised. It was then reflected medially with a Amezcua elevator. The posterior malleolus was identified intraoperatively. Periosteal  incision was made direct over this area. Periosteum was then reflected  to provide adequate exposure to the posterior malleolar fracture. It  was able to be reduced well. Attention was then directed where dissection was performed more  laterally over the posterior fibula. We were able to identify the  fracture intraoperatively. Hematoma was flushed and curetted out and  flushed with fair amount of saline solution thereafter. Point-to-point  reduction clamp was used. Use of fluoroscopy revealed desired position  and alignment of the fibula. K-wire was then used to help stabilize  this. Clamp was then removed. Fracture was still anatomically reduced. 7-hole antiglide plate was placed. Three proximal locking screws and  three distal locking screws were achieved. Rigid internal fixation was  noted. The screws were with appropriate length and not exiting the  anterior aspect of the fibula. We used the ball spike reduction instrument for the posterior malleolus. A Y-shaped plate from the EVOS MINI set was then cut two tabs  proximally. It was then placed into the fracture of the posterior  malleolus. of  cartilage that were able to be seen and carefully removed. The ankle  joint was then flushed with fair amounts of saline solution. Small  drill hole placed proximal to the fracture. Point-to-point reduction  clamp used to reduce the fracture. Intraoperative inspection revealed  that the fracture fragment was anatomically reduced at the shoulder and  medial gutter. Use of fluoroscopy revealed good alignment on AP and  lateral views with no anterior displacement. Guidewire then placed from  the inferior aspect of the medial malleolus into the distal tibia. 70-mm screw was then fully threaded. Screw was then lagged across the  fracture site. Rigid internal fixation and good bone-to-bone contact  were noted. Intraoperatively, there was no gap and the fracture was  rigidly fixated. Use of fluoroscopy revealed desired alignment of the  screw. The area was then flushed with saline solution. The anteromedial ankle  joint capsule was then repaired with 3-0 Vicryl suture, 3-0 Monocryl for  the subcutaneous tissue. Mastisol was applied to the skin edges of the  medial incision and posterolateral incision. 16-cm ZipLine applied to  the posterolateral incision. Good skin-to-skin apposition was noted. Good skin-to-skin apposition was noted of the medial ZipLine as well. Betadine-soaked Adaptic, dry dressing applied. Tourniquet released and  good perfusion returned to the digits of the left foot. The patient was  then placed in a 4 x 30 double-padded posterior splint. The patient tolerated the anesthesia and procedure well. We were  leaving the room when Anesthesia was coming in to perform the left  popliteal block. This time, her vital signs were stable as we were  leaving the room.         Ariel Mesa D.P.M.    D: 03/21/2022 21:38:51       T: 03/21/2022 21:43:25     [email or web address]  Job#: [id number]     Doc#: [id number]    CC:

## 2022-03-22 NOTE — PROGRESS NOTES
Date: 3/22/2022  Patient Name: Skip Andrews        MRN: [id number]   Account: [de-identified]   : 1970  (45 y.o.)  Gender: female   Referring Practitioner: Norval Kayser, DPM  Diagnosis: DVT prophylaxis  Additional Pertinent Hx: Pt is a 45year old female presenting to the Emergency Department via EMS from the [facility] for evaluation of injury after sustaining a fall down 1 step at home. She reports that she was carrying her 7 month old grandchild down the steps this morning and she mis-stepped and fell down one step, rolling her left ankle. She immediately knew that she had broken her left ankle. She was evaluated at the [facility] where she was diagnosed with a displaced trimalleolar fracture. She was transferred to [address] [place] [address] for podiatry and trauma evaluation. The ankle was reduced and a splint was placed. She is being admitted for surgical fixation. ORIF 3/21 for FREDDY Jones requires a Bedside Commode to complete bathing, toileting, dressing and grooming tasks. Patient requires a Bedside Commode due to Upper Extremity/Lower Extremity Weakness and limited ambulation and is unable to walk to the bathroom at home. Without the Bedside Commode, Skip Andrews is at increased risk for falls and would require increased assistance for ADL's and mobility.

## 2022-03-22 NOTE — ANESTHESIA POSTPROCEDURE EVALUATION
Department of Anesthesiology  Postprocedure Note    Patient: Emilee Dee  MRN: [id number]  YOB: 1970  Date of evaluation: 3/22/2022  Time:  11:02 AM     Procedure Summary     Date: 03/21/22 Room / Location: [address] [facility]    Anesthesia Start: 1719 Anesthesia Stop: 2121    Procedure: LEFT ANKLE ORIF (Left Ankle) Diagnosis: (Left ankle fracture)    Surgeons: Dayanara León DPM Responsible Provider: Thelma Duque DO    Anesthesia Type: general, regional ASA Status: 2          Anesthesia Type: general, regional    Kong Phase I: Kong Score: 9    Kong Phase II:      Last vitals: Reviewed and per EMR flowsheets.        Anesthesia Post Evaluation    Patient location during evaluation: PACU  Patient participation: complete - patient participated  Level of consciousness: awake and alert  Airway patency: patent  Nausea & Vomiting: no vomiting and no nausea  Complications: no  Cardiovascular status: hemodynamically stable  Respiratory status: acceptable  Hydration status: stable

## 2022-03-22 NOTE — PROGRESS NOTES
Department of Podiatric Surgery  History and Physical        SUBJECTIVE:    3/22:   Patient seen and evaluated at bedside on behalf of Dr. Bobo Chicas. Patient states her pain to left ankle is 5/10, well-controlled by pain medication regimen. Patient states she feels very strong and stable on her right foot when using assistive devices. She states the PT/OT sessions have gone so well that she would now prefer to DC to home tomorrow. Discussed possible risks of this plan with patient, she remains resolute in her wishes. She denies any fever, chills, nausea, vomiting, chest pain or shortness of breath. HISTORY OF PRESENT ILLNESS:      The patient is a 45 y.o. female with significant past medical history of depression who [facility] after missing a step coming down the stairs. She felt her foot go underneath her and when she sat up she checked to make sure that her bone was not sticking out of her leg. Patient relates that pain rates at 9-10 out of 10 in severity. Denies any numbness or tingling. Patient is able to move toes. She was brought here by EMS from [facility] ambulatory care. She denies any fever, chills, nausea, vomiting, chest pain or shortness of breath. Medications Prior to Admission:   Medications Prior to Admission: escitalopram (LEXAPRO) 10 MG tablet, TAKE 1 TABLET BY MOUTH DAILY  omeprazole (PRILOSEC) 40 MG delayed release capsule, TAKE 1 CAPSULE BY MOUTH ONCE DAILY IN THE MORNING BEFORE BREAKFAST  Multiple Vitamins-Minerals (MULTIVITAMIN ADULT PO), Take by mouth    Allergies:  Patient has no known allergies.       PHYSICAL EXAM:  VITALS:  /60   Pulse 101   Temp 98.7 °F (37.1 °C) (Oral)   Resp 20   Ht 5' 6\" (1.676 m)   Wt 220 lb 0.3 oz (99.8 kg)   SpO2 97%   BMI 35.51 kg/m²   CONSTITUTIONAL:  awake, alert, cooperative, no apparent distress, and appears stated age  EYES:  pupils equal, round and reactive to light, extra ocular muscles intact, sclera clear,   ENT:  normocepalic, without obvious abnormality  NECK:  Supple, symmetrical, trachea midline,skin normal    LOWER EXTREMITY:  Physical Exam:   Posterior splint is clean, dry, intact to LLE. PE is therefore limited. Vascular: CFT brisk to all digits b/l. Negative pain on calf compression b/l. Musculoskeletal: Patient able to wiggle all toes b/l. Neurologic: Light touch and protopathic sensation are intact to digits LLE. IMAGING:  XR ANKLE LEFT (2 VIEWS)    Result Date: 3/21/2022  XR ANKLE LEFT (2 VIEWS), : 2 views, 12 images COMPARISON: CR,SR - XR ANKLE LEFT (MIN 3 VIEWS) - 03/20/2022 12:41 PM EDT FINDINGS: Intraoperative images demonstrate progressive placement of hardware for distal tibia and fibula ORIF. Alignment is anatomic on the final films. Hardware appears intact. No new fracture. No dislocation. Unremarkable soft tissues. Status post distal tibia and fibula ORIF. This document has been electronically signed by: Emilie Elliott MD on 03/21/2022 10:56 PM    XR ANKLE LEFT (2 VIEWS)    Result Date: 3/20/2022  PROCEDURE: XR ANKLE LEFT (2 VIEWS) CLINICAL INFORMATION: reduction COMPARISON: Earlier study same date, 0834 hours. TECHNIQUE: AP and lateral views of the left ankle were obtained through a fiberglass cast. FINDINGS: The previously described fracture dislocation of the ankle has been reduced. The talus has been successfully repositioned in a near normal relationship of the distal tibia. The medial malleolus fracture fragment remains mildly displaced laterally but improved from earlier. The previously described fracture fragment along the lateral/posterior aspect of the distal tibia is also more closely aligned with the distal tibia. Again noted is an oblique fracture of the distal fibular shaft. The  distal fragment is mildly displaced laterally about 4 mm. The previously noted angulation has been eliminated.      Status post reduction of the previously described fracture fragment shows closer apposition but persistent lateral displacement. No other changes were earlier     Limited postreduction images. **This report has been created using voice recognition software. It may contain minor errors which are inherent in voice recognition technology. ** Final report electronically signed by Dr. Zuleika Day on 3/20/2022 1:15 PM    XR CHEST PORTABLE    Result Date: 3/20/2022  PROCEDURE: XR CHEST PORTABLE CLINICAL INFORMATION: pre op COMPARISON: 7/25/2017 TECHNIQUE: A single mobile view of the chest was obtained. Normal mobile chest. **This report has been created using voice recognition software. It may contain minor errors which are inherent in voice recognition technology. ** Final report electronically signed by Dr. Zoe Elkins on 3/20/2022 11:48 AM    CT ANKLE LEFT WO CONTRAST    Result Date: 3/20/2022  PROCEDURE: NONCONTRAST CT LEFT ANKLE: CLINICAL INFORMATION: Fracture dislocation left ankle. TECHNIQUE: Multiple axial 3 mm images of the left ankle were obtained without administration of intravenous contrast material. Computer generated sagittal and coronal images of the left ankle were also reconstructed. ALL CT SCANS AT THIS FACILITY use dose modulation, iterative reconstruction, and/or weight-based dosing when appropriate to reduce radiation dose to as low as reasonably achievable. FINDINGS: There is a mildly comminuted fracture involving the posterior malleolus. The major fracture fragment is displaced posteriorly about 11 mm. There is also a transverse fracture of the medial malleolus which appears mildly comminuted. The major medial malleolar fragment is displaced medially probably 5 mm in distally approximately 3 mm. Note that several small fragments are scattered within the ankle, adjacent to the talus and distal tibia. The talus remains dislocated posteriorly and laterally  relative to the distal tibia.  Note also that there is a mildly comminuted nondisplaced fracture of the distal fibular shaft. Trimalleolar fracture dislocation. The posterior malleolar and medial malleolar fractures are comminuted with several small bone fragments projected over the ankle. **This report has been created using voice recognition software. It may contain minor errors which are inherent in voice recognition technology. ** Final report electronically signed by Dr. Stacey Aguilar on 3/20/2022 11:57 AM    FLUORO FOR SURGICAL PROCEDURES    Result Date: 3/21/2022  Radiology exam is complete. No Radiologist dictation. Please follow up with ordering provider. LABS:  CBC with Differential:    Lab Results   Component Value Date    WBC 8.1 03/21/2022    RBC 4.09 03/21/2022    HGB 12.3 03/21/2022    HCT 39.3 03/21/2022     03/21/2022    MCV 96.1 03/21/2022    MCH 30.1 03/21/2022    MCHC 31.3 03/21/2022    RDW 13.1 03/20/2022    NRBC 0 03/21/2022    SEGSPCT 64.0 03/21/2022    MONOPCT 9.2 03/21/2022    MONOSABS 0.7 03/21/2022    LYMPHSABS 2.0 03/21/2022    EOSABS 0.1 03/21/2022    BASOSABS 0.0 03/21/2022     BMP:    Lab Results   Component Value Date     03/21/2022    K 4.7 03/21/2022     03/21/2022    CO2 27 03/21/2022    BUN 9 03/21/2022    LABALBU 3.2 03/21/2022    CREATININE 0.7 03/21/2022    CALCIUM 8.6 03/21/2022    LABGLOM 87 03/21/2022    GLUCOSE 104 03/21/2022        ASSESSMENT AND PLAN:  1. S/P ORIF Left ankle    -Patient examined and evaluated  -Labs within normal limits  -Continue Lovenox for DVT prophylaxis, trip cast score 7, patient is high risk for developing a deep venous thrombosis.   -Continue PT/OT  -Continue current pain medication regimen  -Potential DC to home tomorrow 3/23  -Podiatry to continue to follow while in-house    DISPO: Likely DC to home tomorrow    Tiorafi Jameson DPM

## 2022-03-22 NOTE — CARE COORDINATION
DISCHARGE/PLANNING EVALUATION  3/22/22, 2:05 PM EDT    Reason for Referral: discharge plan  Mental Status: alert, oriented  Decision Making:  Makes own decisions   Family/Social/Home Environment:  Sean Rodríguez lives at home with her  and adult daughter. Her family is able to assist with care and manage household tasks. Current Services including food security, transportation and housekeeping:  No current services, no concerns prior to admission  Current Equipment: none pta, using crutches, knee scooter  Payment Source: medical mutual  Concerns or Barriers to Discharge: planning home, no concerns  Post acute provider list with quality measures, geographic area and applicable managed care information provided. Questions regarding selection process answered: declined     Teach Back Method used with patient regarding care plan and discharge plan  Patient  verbalizes understanding of the plan of care and contributes to goal setting. Patient goals, treatment preferences and discharge plan:  Spoke with Sean Rodríguez about discharge. She plans home with her  and daughter, denies need for [facility] or other services.       Electronically signed by EDWARDO Davis on 3/22/2022 at 2:05 PM

## 2022-03-22 NOTE — CARE COORDINATION
Deja Marsh was evaluated today and a DME order was entered for a wheeled walker because she requires this to successfully complete daily living tasks of bathing, toileting, personal cares and ambulating. A wheeled walker is necessary due to the patient's unsteady gait, upper body weakness, and inability to  an ambulation device; and she can ambulate only by pushing a walker instead of a lesser assistive device such as a cane, crutch, or standard walker. The need for this equipment was discussed with the patient and she understands and is in agreement. Deja Marsh requires a bedside commode due to being confined to one level of the home, and is physically incapable of utilizing regular toilet facilities. Current body weight is Weight: 220 lb 0.3 oz (99.8 kg).

## 2022-03-22 NOTE — PLAN OF CARE
Problem: Falls - Risk of:  Goal: Will remain free from falls  Description: Will remain free from falls  3/22/2022 0101 by Tony Briceno RN  Outcome: Ongoing  Note: Patient has not had a fall during this hospital stay. Patient continues to use call light when needing assistance. 3/21/2022 1632 by Vonnie Aase, LPN  Outcome: Ongoing  Note: Pt using call light appropriately to call for assistance with ambulation to the bathroom and to chair. Pt is also compliant with use of non-skid slippers. Pt reports understanding of fall prevention when discussed. Problem: Daily Care:  Goal: Daily care needs are met  Description: Daily care needs are met  3/22/2022 0101 by Tony Briceno RN  Outcome: Ongoing  Note: Patient continues to use the call light when needing assistance. All personal items within reach.  3/21/2022 1632 by Vonnie Aase, LPN  Outcome: Ongoing  Note: Participates in ADLs     Problem: Pain:  Description: Pain management should include both nonpharmacologic and pharmacologic interventions. Goal: Patient's pain/discomfort is manageable  Description: Patient's pain/discomfort is manageable  3/22/2022 0101 by Tony Briceno RN  Outcome: Ongoing  Note: Patient states pain relief from PRN pain medications. Pain reassessed one hour post PRN pain medication given. 3/21/2022 1632 by Vonnie Aase, LPN  Outcome: Ongoing  Note: Pt report pain at 4 on scale. Pt states oral/iv medication helping to achieve pain goal of a 4 on scale. Problem: Discharge Planning:  Goal: Patients continuum of care needs are met  Description: Patients continuum of care needs are met  3/22/2022 0101 by Tony Briceno RN  Outcome: Ongoing  3/21/2022 1632 by Vonnie Aase, LPN  Outcome: Ongoing  Note: Pt plans home at discharge. Care manager and social working helping with discharge needs.        Problem: Cardiovascular  Goal: No DVT, peripheral vascular complications  4/81/9013 0101 by Tony Briceno RN  Outcome: Ongoing  Note: Pt without s/s of DVT. Pt able to take prescribed anticoagulants  and SCD,S in place to help prevent development of DVT. 3/21/2022 1632 by Jerome Casillas LPN  Outcome: Ongoing  Note: Pt without s/s of DVT. SCD,S in place to help prevent development of DVT. Problem: Mobility - Impaired:  Goal: Mobility will improve  Description: Mobility will improve  Outcome: Ongoing  Note: Patient is NWB to left leg. Patient working with PT/OT for transfers. .... Komal Brittle Care plan reviewed with patient. Patient verbalizes understanding of the plan of care and contributes to goal setting.

## 2022-03-23 ENCOUNTER — APPOINTMENT (OUTPATIENT)
Dept: CT IMAGING | Age: 52
DRG: 494 | End: 2022-03-23
Payer: COMMERCIAL

## 2022-03-23 ENCOUNTER — APPOINTMENT (OUTPATIENT)
Dept: INTERVENTIONAL RADIOLOGY/VASCULAR | Age: 52
DRG: 494 | End: 2022-03-23
Payer: COMMERCIAL

## 2022-03-23 VITALS
HEART RATE: 97 BPM | TEMPERATURE: 98.2 F | SYSTOLIC BLOOD PRESSURE: 129 MMHG | RESPIRATION RATE: 18 BRPM | OXYGEN SATURATION: 95 % | HEIGHT: 66 IN | BODY MASS INDEX: 35.36 KG/M2 | DIASTOLIC BLOOD PRESSURE: 62 MMHG | WEIGHT: 220.02 LBS

## 2022-03-23 LAB
D-DIMER QUANTITATIVE: 1000 NG/ML FEU (ref 0–500)
EKG ATRIAL RATE: 116 BPM
EKG P AXIS: 36 DEGREES
EKG P-R INTERVAL: 150 MS
EKG Q-T INTERVAL: 324 MS
EKG QRS DURATION: 86 MS
EKG QTC CALCULATION (BAZETT): 450 MS
EKG R AXIS: 52 DEGREES
EKG T AXIS: 27 DEGREES
EKG VENTRICULAR RATE: 116 BPM
TROPONIN T: < 0.01 NG/ML

## 2022-03-23 PROCEDURE — 6370000000 HC RX 637 (ALT 250 FOR IP): Performed by: STUDENT IN AN ORGANIZED HEALTH CARE EDUCATION/TRAINING PROGRAM

## 2022-03-23 PROCEDURE — 36415 COLL VENOUS BLD VENIPUNCTURE: CPT

## 2022-03-23 PROCEDURE — 99255 IP/OBS CONSLTJ NEW/EST HI 80: CPT | Performed by: PHYSICIAN ASSISTANT

## 2022-03-23 PROCEDURE — 97110 THERAPEUTIC EXERCISES: CPT

## 2022-03-23 PROCEDURE — 6360000002 HC RX W HCPCS: Performed by: STUDENT IN AN ORGANIZED HEALTH CARE EDUCATION/TRAINING PROGRAM

## 2022-03-23 PROCEDURE — 85379 FIBRIN DEGRADATION QUANT: CPT

## 2022-03-23 PROCEDURE — 93970 EXTREMITY STUDY: CPT

## 2022-03-23 PROCEDURE — 84484 ASSAY OF TROPONIN QUANT: CPT

## 2022-03-23 PROCEDURE — 93005 ELECTROCARDIOGRAM TRACING: CPT | Performed by: STUDENT IN AN ORGANIZED HEALTH CARE EDUCATION/TRAINING PROGRAM

## 2022-03-23 PROCEDURE — 6370000000 HC RX 637 (ALT 250 FOR IP): Performed by: PHYSICIAN ASSISTANT

## 2022-03-23 PROCEDURE — 2580000003 HC RX 258: Performed by: STUDENT IN AN ORGANIZED HEALTH CARE EDUCATION/TRAINING PROGRAM

## 2022-03-23 PROCEDURE — 97116 GAIT TRAINING THERAPY: CPT

## 2022-03-23 PROCEDURE — 97535 SELF CARE MNGMENT TRAINING: CPT

## 2022-03-23 PROCEDURE — 71275 CT ANGIOGRAPHY CHEST: CPT

## 2022-03-23 PROCEDURE — 6360000004 HC RX CONTRAST MEDICATION: Performed by: STUDENT IN AN ORGANIZED HEALTH CARE EDUCATION/TRAINING PROGRAM

## 2022-03-23 RX ORDER — CALCIUM CARBONATE 200(500)MG
500 TABLET,CHEWABLE ORAL 3 TIMES DAILY PRN
Status: DISCONTINUED | OUTPATIENT
Start: 2022-03-23 | End: 2022-03-23 | Stop reason: HOSPADM

## 2022-03-23 RX ORDER — PANTOPRAZOLE SODIUM 40 MG/1
40 TABLET, DELAYED RELEASE ORAL
Status: DISCONTINUED | OUTPATIENT
Start: 2022-03-23 | End: 2022-03-23 | Stop reason: HOSPADM

## 2022-03-23 RX ADMIN — POLYETHYLENE GLYCOL 3350 17 G: 17 POWDER, FOR SOLUTION ORAL at 07:37

## 2022-03-23 RX ADMIN — PANTOPRAZOLE SODIUM 40 MG: 40 TABLET, DELAYED RELEASE ORAL at 17:01

## 2022-03-23 RX ADMIN — ENOXAPARIN SODIUM 40 MG: 100 INJECTION SUBCUTANEOUS at 17:02

## 2022-03-23 RX ADMIN — IOPAMIDOL 80 ML: 755 INJECTION, SOLUTION INTRAVENOUS at 16:32

## 2022-03-23 RX ADMIN — MORPHINE SULFATE 4 MG: 4 INJECTION, SOLUTION INTRAMUSCULAR; INTRAVENOUS at 03:44

## 2022-03-23 RX ADMIN — SODIUM CHLORIDE, PRESERVATIVE FREE 10 ML: 5 INJECTION INTRAVENOUS at 07:39

## 2022-03-23 RX ADMIN — HYDROCODONE BITARTRATE AND ACETAMINOPHEN 2 TABLET: 5; 325 TABLET ORAL at 00:37

## 2022-03-23 RX ADMIN — HYDROCODONE BITARTRATE AND ACETAMINOPHEN 2 TABLET: 5; 325 TABLET ORAL at 16:59

## 2022-03-23 RX ADMIN — FAMOTIDINE 20 MG: 20 TABLET ORAL at 07:37

## 2022-03-23 RX ADMIN — ESCITALOPRAM OXALATE 10 MG: 10 TABLET ORAL at 07:39

## 2022-03-23 RX ADMIN — HYDROCODONE BITARTRATE AND ACETAMINOPHEN 2 TABLET: 5; 325 TABLET ORAL at 07:00

## 2022-03-23 ASSESSMENT — PAIN DESCRIPTION - LOCATION: LOCATION: ANKLE

## 2022-03-23 ASSESSMENT — PAIN DESCRIPTION - ORIENTATION: ORIENTATION: LEFT

## 2022-03-23 ASSESSMENT — PAIN SCALES - GENERAL
PAINLEVEL_OUTOF10: 8
PAINLEVEL_OUTOF10: 6
PAINLEVEL_OUTOF10: 7
PAINLEVEL_OUTOF10: 8
PAINLEVEL_OUTOF10: 8
PAINLEVEL_OUTOF10: 5

## 2022-03-23 ASSESSMENT — PAIN - FUNCTIONAL ASSESSMENT: PAIN_FUNCTIONAL_ASSESSMENT: PREVENTS OR INTERFERES SOME ACTIVE ACTIVITIES AND ADLS

## 2022-03-23 ASSESSMENT — PAIN DESCRIPTION - PAIN TYPE: TYPE: SURGICAL PAIN

## 2022-03-23 ASSESSMENT — PAIN DESCRIPTION - PROGRESSION: CLINICAL_PROGRESSION: GRADUALLY IMPROVING

## 2022-03-23 ASSESSMENT — PAIN DESCRIPTION - FREQUENCY: FREQUENCY: INTERMITTENT

## 2022-03-23 ASSESSMENT — PAIN DESCRIPTION - ONSET: ONSET: ON-GOING

## 2022-03-23 ASSESSMENT — PAIN DESCRIPTION - DESCRIPTORS: DESCRIPTORS: DISCOMFORT

## 2022-03-23 NOTE — PROGRESS NOTES
[address] [address]  INPATIENT PHYSICAL THERAPY  DAILY NOTE  [facility] [place] ORTHOPEDICS 7K - 7K-16/016-A    Time In: 0530  Time Out: 6128  Timed Code Treatment Minutes: 23 Minutes  Minutes: 23          Date: 3/23/2022  Patient Name: Valencia Langley,  Gender:  female        MRN: [id number]  : 1970  (45 y.o.)     Referring Practitioner: STEPHANY Garcia CNP  Diagnosis: DVT prophylaxis  Additional Pertinent Hx: Per EMR Delfin Frias is a 45year old female presenting to the Emergency Department via EMS from the [facility] for evaluation of injury after sustaining a fall down 1 step at home. She reports that she was carrying her 7 month old grandchild down the steps this morning and she mis-stepped and fell down one step, rolling her left ankle. She immediately knew that she had broken her left ankle. She was evaluated at the [facility] where she was diagnosed with a displaced trimalleolar fracture. She was transferred to [address] [place] [address] for podiatry and trauma evaluation. The ankle was reduced and a splint was placed. She is being admitted for surgical fixation. \" Pt is s/p Open reduction and internal fixation, left trimalleolar anklefracture with posterior lip fixation completed by Dr. Miriam Fontana on 3/21. Prior Level of Function:  Lives With: Spouse,Daughter  Type of Home: House  Home Layout: One level,Performs ADL's on one level,Able to Live on Main level with bedroom/bathroom  Home Access: Stairs to enter with rails  Entrance Stairs - Number of Steps: 1  Entrance Stairs - Rails: Left  Home Equipment: Crutches   Bathroom Shower/Tub: Tub/Shower unit  Bathroom Toilet: Standard    ADL Assistance: Independent  Homemaking Assistance: Independent  Ambulation Assistance: Independent  Transfer Assistance: Independent  Active : Yes  Additional Comments: Plans to obtain walker, shower chair, knee scooter and other equipment from friends and family.  home at all times to assist as needed. Pt amb with no AD, IND with all mobility tasks prior and IND with ADL and IADL's PTA. Restrictions/Precautions:  Restrictions/Precautions: Weight Bearing,General Precautions,Fall Risk  Left Lower Extremity Weight Bearing: Non Weight Bearing  Position Activity Restriction  Other position/activity restrictions: NWB L LE     SUBJECTIVE: RN approved session. Patient in bed, and agreeable to therapy. Patient reported she felt confident on stairs. PAIN: 4-5/10 left ankle    Vitals: Vitals not assessed per clinical judgement, see nursing flowsheet    OBJECTIVE:  Bed Mobility:  Supine to Sit: Contact Guard Assistance, X 1, with head of bed raised, with rail, with increased time for completion  Sit to Supine: Contact Guard Assistance, X 1, with head of bed raised, without rail, with verbal cues , with increased time for completion     Transfers:  Sit to Stand: [address], X 1, with increased time for completion, cues for hand placement, with verbal cues, x2 trials from bed  Stand to Sit:Stand By Assistance, X 1, with increased time for completion, cues for hand placement  Car:verbal instruction given        Ambulation:  Stand By Assistance, with increased time for completion  Distance: 200 ft  Surface: Level Tile  Device:knee scooter  Gait Deviations: Forward Flexed Posture, Slow Hailey, Decreased Step Length on Right    Balance:  Dynamic Standing Balance: Contact Guard Assistance, while performing exercises and use of bilateral UE on RW    Exercise:  Patient was guided in 1 set(s) 10 reps of exercise to both lower extremities. Ankle pumps, Glut sets, Quad sets, Heelslides, Standing hip abduction/adduction, Standing hip extension and Standing hamstring curls. Exercises were completed for increased independence with functional mobility.   *standing exercises only with LLE and ankle pumps only with RLE    Functional Outcome Measures: Completed  AM-PAC Inpatient Mobility Raw Score : 18  AM-PAC Inpatient T-Scale Score : 43.63    ASSESSMENT:  Assessment: Patient progressing toward established goals. Activity Tolerance:  Patient tolerance of  treatment: good. Equipment Recommendations:Equipment Needed: No (will cont to assess, plans to obtain equipment from family and friends)  Discharge Recommendations: Home Independently and with   Plan: Times per week: 6x O/T  Specific instructions for Next Treatment: trial knee scooter, stairs  Current Treatment Recommendations: Strengthening,Home Exercise Program,Neuromuscular Re-education,Safety Education & Monna Fennel Training,Patient/Caregiver Education & Training,Functional Mobility Training,Equipment Evaluation, Education, & procurement,Transfer Training,Gait Training,Stair training    Patient Education  Patient Education: Plan of Care, Precautions/Restrictions, [employer], Transfers, Gait, Car Transfers, Up in Chair for Liz Deutsch, Verbal Exercise Instruction    Goals:  Patient goals : return home with   Short term goals  Time Frame for Short term goals: By discharge  Short term goal 1: Pt will amb with LRAD for 50 feet with good demo of WB status with S to progress with overall mobility. Short term goal 2: Pt will demo S with transfers with RW for support with good demo of WB status to progress with mobility. Short term goal 3: Pt will demo IND with bed mobility tasks with good safety to progress with overall mobility. Short term goal 4: Pt will demo S for car transfers with good demo of WB status to return home safely. Short term goal 5: Pt will negotiate steps for 3 REJI the home with B rail with SBA for safety to progress with overall mobility. Long term goals  Time Frame for Long term goals : NA due to short ELOS    Following session, patient left in safe position with all fall risk precautions in place.

## 2022-03-23 NOTE — PROGRESS NOTES
[facility]  Occupational Therapy  Daily Note  Time:   Time In: 1025  Time Out: 1049  Timed Code Treatment Minutes: 24 Minutes  Minutes: 24          Date: 3/23/2022  Patient Name: Deja Marsh,   Gender: female      Room: -16/016-A  MRN: [id number]  : 1970  (45 y.o.)  Referring Practitioner: Seng Pino DPM  Diagnosis: DVT prophylaxis  Additional Pertinent Hx: Pt is a 45year old female presenting to the Emergency Department via EMS from the [facility] for evaluation of injury after sustaining a fall down 1 step at home. She reports that she was carrying her 7 month old grandchild down the steps this morning and she mis-stepped and fell down one step, rolling her left ankle. She immediately knew that she had broken her left ankle. She was evaluated at the [facility] where she was diagnosed with a displaced trimalleolar fracture. She was transferred to [address] [place] [address] for podiatry and trauma evaluation. The ankle was reduced and a splint was placed. She is being admitted for surgical fixation. ORIF 3/21 for L LE    Restrictions/Precautions:  Restrictions/Precautions: Weight Bearing,General Precautions,Fall Risk  Left Lower Extremity Weight Bearing: Non Weight Bearing  Position Activity Restriction  Other position/activity restrictions: NWB L LE     SUBJECTIVE: Patient supine in bed supported upon arrival; agreeable to therapy this date. Patient pleasant and motivated throughout session. PAIN: no numerical scale provided    Vitals: Vitals not assessed per clinical judgement, see nursing flowsheet    COGNITION: WFL    ADL:   Lower Extremity Dressing: Contact Guard Assistance. Patient able to doff/sean sock seated. patient completed doff/sean hosptial shorts with reacher with visual demonstration initially with ability to demonstrate carryover. BALANCE:  Sitting Balance:  Modified Independent.     Standing Balance: Contact Guard Assistance. RW, no LOB    BED MOBILITY:  Supine to Sit: Supervision      TRANSFERS:  Sit to Stand:  Supervision. Stand to Sit: Supervision. FUNCTIONAL MOBILITY:  Assistive Device: Rolling Walker  Assist Level:  Stand By Assistance. Distance: EOB to bedside chair x 4 feet   Verbal cues for safety to prevent falls, no LOB    ADDITIONAL ACTIVITIES:  Patient completed BUE strengthening exercises with skilled education on HEP: completed x15 reps x1 set with a moderate resistance band in all joints and all planes in order to improve UE strength and activity tolerance required for BADL routine and toilet / shower transfers. Patient tolerated good, requiring minimal rest breaks. Patient also required minimal verbal/visual cues for technique. Patient educated regarding safety with ADLs/IADLs in order to increase independence upon d/t to home; verbalizing understanding. ASSESSMENT:     Activity Tolerance:  Patient tolerance of  treatment: good. Discharge Recommendations: Home with assist as needed  Equipment Recommendations: Equipment Needed: Yes  Mobility Devices: OK[employer]in Devices  ADL Assistive Devices:  Toileting - Standard Commode,Transfer Tub Bench  Plan: Times per week: 5x  Times per day: Daily  Current Treatment Recommendations: Strengthening,Functional Mobility Training,Endurance Training,Safety Education & Training,Self-Care / ADL,Equipment Evaluation, Education, & procurement,Patient/Caregiver Education & Training    Patient Education  Patient Education: Plan of Care, ADL's, IADL's, Energy Conservation, Home Exercise Program, Precautions, Equipment Education, Home Safety, Importance of Increasing Activity and Assistive Device Safety    Goals  Short term goals  Time Frame for Short term goals: by discharge  Short term goal 1: Pt will complete functional t/fs S in prep for toileting tasks  Short term goal 2: Pt will complete LB ADLs S for dressing and bathing tasks  Short term goal 3: Pt will complete functional mobility HH distances S in prep for ADLs    Following session, patient left in safe position with all fall risk precautions in place.

## 2022-03-23 NOTE — PLAN OF CARE
Problem: Falls - Risk of:  Goal: Will remain free from falls  Description: Will remain free from falls  3/23/2022 0228 by Seble Bunn RN  Outcome: Ongoing  Note: No injury has occurred this shift . Fall precautions in place and hourly rounding. Will monitor. 3/22/2022 1830 by Cedric Mir RN  Outcome: Ongoing  Note: Patient up with staff assistance. Able to use call light. Patient has remained free of falls during this shift. Appropriate fall prevention measures in place. Problem: Daily Care:  Goal: Daily care needs are met  Description: Daily care needs are met  3/23/2022 0228 by Seble Bunn RN  Outcome: Ongoing  Note: Patient uses call light for needs, all items within reach.  3/22/2022 1830 by Cedric Mir RN  Outcome: Ongoing  Note: Patient care needs are met as they arise. Patient able to use call light when needing assistance. Problem: Pain:  Description: Pain management should include both nonpharmacologic and pharmacologic interventions. Goal: Patient's pain/discomfort is manageable  Description: Patient's pain/discomfort is manageable  3/23/2022 0228 by Seble Bunn RN  Outcome: Ongoing  Note: Pt report pain at 7-8 on scale. Pt states oral/iv medication helping to achieve pain goal of a 0 on scale. 3/22/2022 1830 by Cedric Mir RN  Outcome: Ongoing  Note: Patient taking pain medication on MAR as needed to control pain. Use of non-pharmacologic pain management including ice/repositioning. Patient pain goal is 3/10. Problem: Discharge Planning:  Goal: Patients continuum of care needs are met  Description: Patients continuum of care needs are met  3/23/2022 0228 by Seble Bunn RN  Outcome: Ongoing  Note: Pt plans to return home  at discharge. Care manager and social working helping with discharge needs. 3/22/2022 1830 by Cedric Mir RN  Outcome: Ongoing  Note: Patient plans to return home with family.  Pt receiving equipment for d/c     Problem: Cardiovascular  Goal: No DVT, peripheral vascular complications  8/48/4793 0228 by Magdalena Martínez RN  Outcome: Ongoing  Note: Pt without s/s of DVTs. SCD,S in place to help prevent development of DVT. 3/22/2022 1830 by Darline Del Angel RN  Outcome: Ongoing  Note: Pt receiving lovenox for dvt prophylaxis     Problem: Mobility - Impaired:  Goal: Mobility will improve  Description: Mobility will improve  3/23/2022 0228 by Magdalena Martínez RN  Outcome: Ongoing  Note: Up with one  assist, walker, and gait belt, and with steady gait. Tolerates working with PT and OT well.   3/22/2022 1830 by Darline Del Angel RN  Outcome: Ongoing  Note: Pt tolerates ambulating with walker, maintains non WB on left leg. Pt also uses knee scooter     Problem: DISCHARGE BARRIERS  Goal: Patient's continuum of care needs are met  3/23/2022 0228 by Magdalena Martínez RN  Outcome: Ongoing  3/22/2022 1830 by Darline Del Angel RN  Outcome: Ongoing  Note: Discharge planning is in progress    . Frantz Hooks Care plan reviewed with patient. Patient  verbalize understanding of the plan of care and contribute to goal setting.

## 2022-03-23 NOTE — FLOWSHEET NOTE
[facility] [address] PROGRESS NOTE      Patient: Stella Rushing  Room #: 10K-16/56-A            YOB: 1970  Age: 45 y.o. Gender: female            Admit Date & Time: 3/20/2022  8:24 AM    Assessment:  Azael Ludwig is a 45year old female who is in bed. Her  is in the room with her. She belongs to the [facility] and her Sabianist has been notified. Interventions:  Prayer was offered and accepted for her healing    Outcomes:  encouraged    Plan: 1. Care Plan:  Continue spiritual and emotional care for patient and family. Including prayers.    Electronically signed by Shauna Barrios on 3/23/2022 at 3:14 PM.  [facility] [facility]  [phone]

## 2022-03-23 NOTE — DISCHARGE SUMMARY
Physician Discharge Summary     Patient ID:  Abdirahman Gillis  [id number]    Physician: René Borrero MD     Admission date: 3/20/2022    Discharge date: No discharge date for patient encounter. \    Date of Surgery: 3/21/2022    Admission Diagnoses: DVT prophylaxis [Z29.9]  Fall down stairs, initial encounter July Mcclain  Fall at home, initial encounter [W19. XXXA, Y92.009]  Closed displaced trimalleolar fracture of left ankle, initial encounter [S82.852A]    Discharge Diagnoses: Status post ORIF left trimalleolar ankle fracture    Procedures: ORIF left trimalleolar ankle fracture    History, Physical Exam:   Department of Podiatric Surgery  History and Physical        SUBJECTIVE:   3/23/22  Patient seen at bedside today on behalf of Dr. Lamont Noriega. Patient relates that her pain is well controlled with oral pain medication. She denies any fever, chills, nausea, vomiting, chest pain or shortness of breath. She is discharging on Xarelto. She feels safe for discharge home as physical therapy and Occupational Therapy have been going well. Patient has no other pedal complaints at this time      3/22:   Patient seen and evaluated at bedside on behalf of Dr. Lamont Noriega. Patient states her pain to left ankle is 5/10, well-controlled by pain medication regimen. Patient states she feels very strong and stable on her right foot when using assistive devices. She states the PT/OT sessions have gone so well that she would now prefer to DC to home tomorrow. Discussed possible risks of this plan with patient, she remains resolute in her wishes. She denies any fever, chills, nausea, vomiting, chest pain or shortness of breath. HISTORY OF PRESENT ILLNESS:      The patient is a 45 y.o. female with significant past medical history of depression who presents to [facility]'s ED after missing a step coming down the stairs.   She felt her foot go underneath her and when she sat up she checked to make sure that her bone was not sticking out of her leg. Patient relates that pain rates at 9-10 out of 10 in severity. Denies any numbness or tingling. Patient is able to move toes. She was brought here by EMS from [facility] ambulatory care. She denies any fever, chills, nausea, vomiting, chest pain or shortness of breath. Medications Prior to Admission:   Medications Prior to Admission: escitalopram (LEXAPRO) 10 MG tablet, TAKE 1 TABLET BY MOUTH DAILY  omeprazole (PRILOSEC) 40 MG delayed release capsule, TAKE 1 CAPSULE BY MOUTH ONCE DAILY IN THE MORNING BEFORE BREAKFAST  Multiple Vitamins-Minerals (MULTIVITAMIN ADULT PO), Take by mouth    Allergies:  Patient has no known allergies. PHYSICAL EXAM:  VITALS:  /72   Pulse 90   Temp 99 °F (37.2 °C) (Oral)   Resp 16   Ht 5' 6\" (1.676 m)   Wt 220 lb 0.3 oz (99.8 kg)   SpO2 96%   BMI 35.51 kg/m²   CONSTITUTIONAL:  awake, alert, cooperative, no apparent distress, and appears stated age  EYES:  pupils equal, round and reactive to light, extra ocular muscles intact, sclera clear,   ENT:  normocepalic, without obvious abnormality  NECK:  Supple, symmetrical, trachea midline,skin normal    LOWER EXTREMITY:  Physical Exam:   Posterior splint is clean, dry, intact to LLE. PE is therefore limited. Vascular: CFT brisk to all digits b/l. Negative pain on calf compression b/l. Musculoskeletal: Patient able to wiggle all toes b/l. Neurologic: Light touch and protopathic sensation are intact to digits LLE. IMAGING:  XR ANKLE LEFT (2 VIEWS)    Result Date: 3/21/2022  XR ANKLE LEFT (2 VIEWS), : 2 views, 12 images COMPARISON: ARA,SR - XR ANKLE LEFT (MIN 3 VIEWS) - 03/20/2022 12:41 PM EDT FINDINGS: Intraoperative images demonstrate progressive placement of hardware for distal tibia and fibula ORIF. Alignment is anatomic on the final films. Hardware appears intact. No new fracture. No dislocation. Unremarkable soft tissues. Status post distal tibia and fibula ORIF.  This document has been electronically signed by: Nickie Murillo MD on 03/21/2022 10:56 PM    XR ANKLE LEFT (2 VIEWS)    Result Date: 3/20/2022  PROCEDURE: XR ANKLE LEFT (2 VIEWS) CLINICAL INFORMATION: reduction COMPARISON: Earlier study same date, 0834 hours. TECHNIQUE: AP and lateral views of the left ankle were obtained through a fiberglass cast. FINDINGS: The previously described fracture dislocation of the ankle has been reduced. The talus has been successfully repositioned in a near normal relationship of the distal tibia. The medial malleolus fracture fragment remains mildly displaced laterally but improved from earlier. The previously described fracture fragment along the lateral/posterior aspect of the distal tibia is also more closely aligned with the distal tibia. Again noted is an oblique fracture of the distal fibular shaft. The  distal fragment is mildly displaced laterally about 4 mm. The previously noted angulation has been eliminated. Status post reduction of the previously described fracture dislocation of the ankle. **This report has been created using voice recognition software. It may contain minor errors which are inherent in voice recognition technology. ** Final report electronically signed by Dr. Jorge Alberto Voss on 3/20/2022 11:41 AM    XR ANKLE LEFT (2 VIEWS)    Result Date: 3/20/2022  PROCEDURE: XR ANKLE LEFT (2 VIEWS) CLINICAL INFORMATION: FALL / DEFORMITY / SWELLING / HEARD AND FELT A POP IN HER ANKLE COMPARISON: No prior study. TECHNIQUE: AP and lateral views of the left ankle were obtained. FINDINGS: There is dislocation of the talus laterally and posteriorly and a transverse fracture through the base of the medial malleolus. The medial malleolar fragment is displaced laterally along with the talus, approximately 2 cm. There is also a fracture along the lateral aspect of the distal tibia. There is also fragmented ascites also displaced laterally with the talus.  In addition, there is a moderately angulated mildly oblique fracture of the distal fibular shaft with a concavity directed laterally and posteriorly. . The distal fragment is displaced anteriorly and proximally about 1 shaft width. Note that the lateral malleolus is normal in related to the dislocated talus. Fracture dislocation of the ankle involving the medial malleolus, the distal tibia laterally, and the distal fibular shaft, as described above. **This report has been created using voice recognition software. It may contain minor errors which are inherent in voice recognition technology. ** Final report electronically signed by Dr. Lindsey Mireles on 3/20/2022 8:57 AM    XR ANKLE LEFT (MIN 3 VIEWS)    Result Date: 3/20/2022  PROCEDURE: XR ANKLE LEFT (MIN 3 VIEWS) CLINICAL INFORMATION: reduction . TECHNIQUE: Limited AP and lateral projections done through fiberglass splinting COMPARISON: 3/20/2022 FINDINGS: The distal fibular fracture does not appear significant change from immediate postreduction image. The medial malleolus fragment shows closer apposition but persistent lateral displacement. No other changes were earlier     Limited postreduction images. **This report has been created using voice recognition software. It may contain minor errors which are inherent in voice recognition technology. ** Final report electronically signed by Dr. Liat Davis on 3/20/2022 1:15 PM    XR CHEST PORTABLE    Result Date: 3/20/2022  PROCEDURE: XR CHEST PORTABLE CLINICAL INFORMATION: pre op COMPARISON: 7/25/2017 TECHNIQUE: A single mobile view of the chest was obtained. Normal mobile chest. **This report has been created using voice recognition software. It may contain minor errors which are inherent in voice recognition technology. ** Final report electronically signed by Dr. Lindsey Mireles on 3/20/2022 11:48 AM    CT ANKLE LEFT WO CONTRAST    Result Date: 3/20/2022  PROCEDURE: NONCONTRAST CT LEFT ANKLE: CLINICAL INFORMATION: Fracture dislocation left ankle. TECHNIQUE: Multiple axial 3 mm images of the left ankle were obtained without administration of intravenous contrast material. Computer generated sagittal and coronal images of the left ankle were also reconstructed. ALL CT SCANS AT THIS FACILITY use dose modulation, iterative reconstruction, and/or weight-based dosing when appropriate to reduce radiation dose to as low as reasonably achievable. FINDINGS: There is a mildly comminuted fracture involving the posterior malleolus. The major fracture fragment is displaced posteriorly about 11 mm. There is also a transverse fracture of the medial malleolus which appears mildly comminuted. The major medial malleolar fragment is displaced medially probably 5 mm in distally approximately 3 mm. Note that several small fragments are scattered within the ankle, adjacent to the talus and distal tibia. The talus remains dislocated posteriorly and laterally  relative to the distal tibia. Note also that there is a mildly comminuted nondisplaced fracture of the distal fibular shaft. Trimalleolar fracture dislocation. The posterior malleolar and medial malleolar fractures are comminuted with several small bone fragments projected over the ankle. **This report has been created using voice recognition software. It may contain minor errors which are inherent in voice recognition technology. ** Final report electronically signed by Dr. Cesar Pérez on 3/20/2022 11:57 AM    FLUORO FOR SURGICAL PROCEDURES    Result Date: 3/21/2022  Radiology exam is complete. No Radiologist dictation. Please follow up with ordering provider.      LABS:  CBC with Differential:    Lab Results   Component Value Date    WBC 8.1 03/21/2022    RBC 4.09 03/21/2022    HGB 12.3 03/21/2022    HCT 39.3 03/21/2022     03/21/2022    MCV 96.1 03/21/2022    MCH 30.1 03/21/2022    MCHC 31.3 03/21/2022    RDW 13.1 03/20/2022    NRBC 0 03/21/2022    SEGSPCT 64.0 03/21/2022    MONOPCT 9.2 03/21/2022    MONOSABS 0.7 03/21/2022    LYMPHSABS 2.0 03/21/2022    EOSABS 0.1 03/21/2022    BASOSABS 0.0 03/21/2022     BMP:    Lab Results   Component Value Date     03/21/2022    K 4.7 03/21/2022     03/21/2022    CO2 27 03/21/2022    BUN 9 03/21/2022    LABALBU 3.2 03/21/2022    CREATININE 0.7 03/21/2022    CALCIUM 8.6 03/21/2022    LABGLOM 87 03/21/2022    GLUCOSE 104 03/21/2022        ASSESSMENT AND PLAN:  1. S/P ORIF Left ankle    -Patient examined and evaluated  -Labs within normal limits  -Continue Lovenox for DVT prophylaxis, trip cast score 7, patient is high risk for developing a deep venous thrombosis. Patient to take Xarelto at home on discharge  -Podiatry to continue to follow while in-house    DISPO: Discharge    Kamilla Crandall DPM         Discharge Physician: Kamilla Crandall, [facility] Course: Patient was admitted on 3/20/2022 for closed dislocated trimalleolar ankle fracture of the left ankle. Patient was reduced and splinted in the ED. CT scan was obtained for preoperative planning. Patient was placed on DVT prophylaxis. Patient was consented for surgery on 3/21/2022 in the form of an open reduction internal fixation. Surgery went well. Patient was placed on IV pain medication for postoperative pain and work with physical therapy and Occupational Therapy on 3/22/2022. Patient's discharge 3/23/2022. Consults: PT and OT    Discharged Condition: good    Disposition: home    Patient Instructions:   Current Discharge Medication List      START taking these medications    Details   oxyCODONE-acetaminophen (PERCOCET) 5-325 MG per tablet Take 1 tablet by mouth every 6 hours as needed for Pain for up to 7 days.  1-2 tabs every 4-6 hours prn pain  Qty: 28 tablet, Refills: 0    Comments: Reduce doses taken as pain becomes manageable  Associated Diagnoses: Closed displaced trimalleolar fracture of left ankle, initial encounter      traMADol (ULTRAM) 50 MG tablet Take 1 tablet by mouth every 6 hours as needed for Pain for up to 7 days. Intended supply: 7 days. Take lowest dose possible to manage pain  Qty: 28 tablet, Refills: 0    Comments: Reduce doses taken as pain becomes manageable  Associated Diagnoses: Closed displaced trimalleolar fracture of left ankle, initial encounter      rivaroxaban (XARELTO) 10 MG TABS tablet Take 1 tablet by mouth daily (with breakfast) for 14 days  Qty: 14 tablet, Refills: 1    Associated Diagnoses: DVT prophylaxis         CONTINUE these medications which have CHANGED    Details   cyclobenzaprine (FLEXERIL) 10 MG tablet Take 1 tablet by mouth 3 times daily as needed for Muscle spasms  Qty: 30 tablet, Refills: 0    Associated Diagnoses: Closed displaced trimalleolar fracture of left ankle, initial encounter         CONTINUE these medications which have NOT CHANGED    Details   escitalopram (LEXAPRO) 10 MG tablet TAKE 1 TABLET BY MOUTH DAILY  Qty: 90 tablet, Refills: 3      omeprazole (PRILOSEC) 40 MG delayed release capsule TAKE 1 CAPSULE BY MOUTH ONCE DAILY IN THE MORNING BEFORE BREAKFAST  Qty: 90 capsule, Refills: 0      Multiple Vitamins-Minerals (MULTIVITAMIN ADULT PO) Take by mouth         STOP taking these medications       tiZANidine (ZANAFLEX) 4 MG tablet Comments:   Reason for Stopping:             Activity: activity as tolerated  Diet: regular diet  Wound Care: keep wound clean and dry    Follow-up with with Dr. Jania Flores outpatient.     Signed:  Myrla Hashimoto, DPM,   Podiatric Surgical Resident  3/23/2022  7:15 AM

## 2022-03-23 NOTE — PROGRESS NOTES
Ekg ordered per protocol as pt c/o chest pain. Secure message sent to Resident, Rhea Chowdary. [id number] [place] sent to Rhea Chowdary with D-dimer results.  And dressing change

## 2022-03-23 NOTE — H&P
Hospitalist Consult Note      Patient:  Willie Vargas    Unit/Bed:7K-16/016-A  YOB: 1970  MRN: [id number]   Acct: [de-identified]   PCP: Jeremiah Red MD  Code Status: Full Code  Date of Admission: 3/20/2022  Date of Service: Pt seen/examined in consultation on 3/23/2022      Chief Complaint:  Fall  Reason for consult:  \"PE evaluation, POD #2 ORIF left ankle\"    Assessment and Plan:    1. Chest pain: symptoms are most consistent with GI etiology. Given her recent surgery, will r/o other causes. EKG shows sinus tachycardia, will check troponin. Venous doppler was negative for DVT, CTA Chest is pending. Patient has been on Lovenox for DVT prophylaxis. If troponin and CTA Chest are negative, okay to still discharge patient today from Hospitalist standpoint. Follow up with PCP. 2. GERD: On 40mg omeprazole every morning at home, will resume this. 3. Left ankle fracture: s/p ORIF on 3/21. Podiatry is primary. 4. Hx depression:  Continue home meds    5. Sinus tachycardia: Inermittent and mild, heart rate low 100s at times. Continue IV fluids. Follow-up with PCP. History Of Present Illness:    Onetha [age] y.o. female who we are asked to see/evaluate by Avtar Donato DPM for medical management of PE evaluation. The patient states that she has had intermittent epigastric/chest pain since being in the hospital.  She states the pain is worse when eating, resolved shortly after. The patient states that at home she was taking 40 mg of omeprazole 30 minutes prior to eating breakfast.  She has been on Pepcid nightly here, and states that Pepcid has never worked as well for her. She states that her chest pain feels similar to it she has been out of her omeprazole for couple days at home before. The patient denies shortness of breath, palpitations, lightheadedness, fever/chills, constipation, N/V/D. Patient has epigastric tenderness on exam.  Currently chest pain-free.   Primary had ordered D-dimer, which was elevated as patient just had surgery. Venous Doppler of lower extremities was negative. CTA of chest is pending. Canceled CTA abdomen pelvis as there is low suspicion for PE or other blood clots. Patient has been on DVT prophylaxis with Lovenox. Hospitalist will continue to follow for results of CTA. Otherwise if CTA and troponin are negative, okay to discharge from hospital standpoint today. Review of systems:   Pertinent positives as noted in the HPI. All other systems reviewed and negative. Past Medical History:        Diagnosis Date    Depression        Past Surgical History:        Procedure Laterality Date    ANKLE FRACTURE SURGERY Left 3/21/2022    LEFT ANKLE ORIF performed by Ace Arriaga DPM at [place] [address] Medications:   No current facility-administered medications on file prior to encounter. Current Outpatient Medications on File Prior to Encounter   Medication Sig Dispense Refill    escitalopram (LEXAPRO) 10 MG tablet TAKE 1 TABLET BY MOUTH DAILY 90 tablet 3    omeprazole (PRILOSEC) 40 MG delayed release capsule TAKE 1 CAPSULE BY MOUTH ONCE DAILY IN THE MORNING BEFORE BREAKFAST 90 capsule 0    Multiple Vitamins-Minerals (MULTIVITAMIN ADULT PO) Take by mouth         Allergies:    Patient has no known allergies. Social History:    reports that she has never smoked. She has never used smokeless tobacco. She reports current alcohol use. She reports that she does not use drugs. Family History:       Problem Relation Age of Onset    Cancer Mother 64        Colon cancer    Other Father         Overweight and alcohol abuse    Alcohol Abuse Father     Cancer Sister         Skin and thyroid cancer       Diet:  ADULT DIET;  Regular      PHYSICAL EXAM:  /65   Pulse 100   Temp 98.3 °F (36.8 °C) (Oral)   Resp 16   Ht 5' 6\" (1.676 m)   Wt 220 lb 0.3 oz (99.8 kg)   SpO2 95%   BMI 35.51 kg/m²   General appearance:  No apparent distress, appears stated age and cooperative. HEENT: Normal cephalic, atraumatic without obvious deformity. Pupils equal, round, and reactive to light. Extra ocular muscles intact. Conjunctivae/corneas clear. Neck: Supple, with full range of motion. No jugular venous distention. Trachea midline. Respiratory:  Normal respiratory effort. Clear to auscultation, bilaterally without Rales/Wheezes/Rhonchi. Cardiovascular: Regular rate and rhythm with normal S1/S2 without murmurs, rubs or gallops. Abdomen: Soft, epigastric tenderness, non-distended with normal bowel sounds. Musculoskeletal:  No clubbing, cyanosis or edema bilaterally. Skin: Skin color, texture, turgor normal.  No rashes or lesions. Neurologic:  Neurovascularly intact without any focal sensory/motor deficits. Cranial nerves: II-XII intact, grossly non-focal.  Psychiatric: Alert and oriented, thought content appropriate, normal insight  Capillary Refill: Brisk,< 3 seconds   Peripheral Pulses: +2 palpable, equal bilaterally     Labs:   Recent Labs     03/21/22  0615   WBC 8.1   HGB 12.3   HCT 39.3        Recent Labs     03/21/22  0615      K 4.7      CO2 27   BUN 9   CREATININE 0.7   CALCIUM 8.6     Recent Labs     03/21/22  0615   AST 16   ALT 11   BILITOT 0.3   ALKPHOS 54     No results for input(s): INR in the last 72 hours. No results for input(s): Meldon Rivres in the last 72 hours. Urinalysis:    No results found for: Altagracia Speedy, BACTERIA, RBCUA, BLOODU, [address], [address]    Radiology:   VL DUP LOWER EXTREMITY VENOUS BILATERAL   Final Result   No evidence of a DVT. **This report has been created using voice recognition software. It may contain minor errors which are inherent in voice recognition technology. **      Final report electronically signed by Dr Maritza Ross on 3/23/2022 11:30 AM      FLUORO FOR SURGICAL PROCEDURES   Final Result      XR ANKLE LEFT (2 VIEWS)   Final Result   Status post distal tibia and fibula ORIF. This document has been electronically signed by: Maddie Edwards MD on    03/21/2022 10:56 PM      XR ANKLE LEFT (MIN 3 VIEWS)   Final Result   Limited postreduction images. **This report has been created using voice recognition software. It may contain minor errors which are inherent in voice recognition technology. **      Final report electronically signed by Dr. Derek Victor on 3/20/2022 1:15 PM      XR CHEST PORTABLE   Final Result   Normal mobile chest.            **This report has been created using voice recognition software. It may contain minor errors which are inherent in voice recognition technology. **      Final report electronically signed by Dr. Kathy Ron on 3/20/2022 11:48 AM      CT ANKLE LEFT WO CONTRAST   Final Result   Trimalleolar fracture dislocation. The posterior malleolar and medial malleolar fractures are comminuted with several small bone fragments projected over the ankle. **This report has been created using voice recognition software. It may contain minor errors which are inherent in voice recognition technology. **      Final report electronically signed by Dr. Kathy Ron on 3/20/2022 11:57 AM      XR ANKLE LEFT (2 VIEWS)   Final Result   Status post reduction of the previously described fracture dislocation of the ankle. **This report has been created using voice recognition software. It may contain minor errors which are inherent in voice recognition technology. **      Final report electronically signed by Dr. Kathy Ron on 3/20/2022 11:41 AM      XR ANKLE LEFT (2 VIEWS)   Final Result   Fracture dislocation of the ankle involving the medial malleolus, the distal tibia laterally, and the distal fibular shaft, as described above. **This report has been created using voice recognition software.   It may contain minor errors which are inherent in voice 3/20/2022  PROCEDURE: XR ANKLE LEFT (2 VIEWS) CLINICAL INFORMATION: FALL / DEFORMITY / SWELLING / HEARD AND FELT A POP IN HER ANKLE COMPARISON: No prior study. TECHNIQUE: AP and lateral views of the left ankle were obtained. FINDINGS: There is dislocation of the talus laterally and posteriorly and a transverse fracture through the base of the medial malleolus. The medial malleolar fragment is displaced laterally along with the talus, approximately 2 cm. There is also a fracture along the lateral aspect of the distal tibia. There is also fragmented ascites also displaced laterally with the talus. In addition, there is a moderately angulated mildly oblique fracture of the distal fibular shaft with a concavity directed laterally and posteriorly. . The distal fragment is displaced anteriorly and proximally about 1 shaft width. Note that the lateral malleolus is normal in related to the dislocated talus. Fracture dislocation of the ankle involving the medial malleolus, the distal tibia laterally, and the distal fibular shaft, as described above. **This report has been created using voice recognition software. It may contain minor errors which are inherent in voice recognition technology. ** Final report electronically signed by Dr. Abril Mclaughlin on 3/20/2022 8:57 AM    XR ANKLE LEFT (MIN 3 VIEWS)    Result Date: 3/20/2022  PROCEDURE: XR ANKLE LEFT (MIN 3 VIEWS) CLINICAL INFORMATION: reduction . TECHNIQUE: Limited AP and lateral projections done through fiberglass splinting COMPARISON: 3/20/2022 FINDINGS: The distal fibular fracture does not appear significant change from immediate postreduction image. The medial malleolus fragment shows closer apposition but persistent lateral displacement. No other changes were earlier     Limited postreduction images. **This report has been created using voice recognition software. It may contain minor errors which are inherent in voice recognition technology. ** Final report electronically signed by Dr. Princess Negrete on 3/20/2022 1:15 PM    XR CHEST PORTABLE    Result Date: 3/20/2022  PROCEDURE: XR CHEST PORTABLE CLINICAL INFORMATION: pre op COMPARISON: 7/25/2017 TECHNIQUE: A single mobile view of the chest was obtained. Normal mobile chest. **This report has been created using voice recognition software. It may contain minor errors which are inherent in voice recognition technology. ** Final report electronically signed by Dr. Paula Samano on 3/20/2022 11:48 AM    CT ANKLE LEFT WO CONTRAST    Result Date: 3/20/2022  PROCEDURE: NONCONTRAST CT LEFT ANKLE: CLINICAL INFORMATION: Fracture dislocation left ankle. TECHNIQUE: Multiple axial 3 mm images of the left ankle were obtained without administration of intravenous contrast material. Computer generated sagittal and coronal images of the left ankle were also reconstructed. ALL CT SCANS AT THIS FACILITY use dose modulation, iterative reconstruction, and/or weight-based dosing when appropriate to reduce radiation dose to as low as reasonably achievable. FINDINGS: There is a mildly comminuted fracture involving the posterior malleolus. The major fracture fragment is displaced posteriorly about 11 mm. There is also a transverse fracture of the medial malleolus which appears mildly comminuted. The major medial malleolar fragment is displaced medially probably 5 mm in distally approximately 3 mm. Note that several small fragments are scattered within the ankle, adjacent to the talus and distal tibia. The talus remains dislocated posteriorly and laterally  relative to the distal tibia. Note also that there is a mildly comminuted nondisplaced fracture of the distal fibular shaft. Trimalleolar fracture dislocation. The posterior malleolar and medial malleolar fractures are comminuted with several small bone fragments projected over the ankle. **This report has been created using voice recognition software.   It may contain minor errors which are inherent in voice recognition technology. ** Final report electronically signed by Dr. Jose Hernandez on 3/20/2022 11:57 AM    FLUORO FOR SURGICAL PROCEDURES    Result Date: 3/21/2022  Radiology exam is complete. No Radiologist dictation. Please follow up with ordering provider. EKG: sinus tachycardia      Thank you for allowing us to participate in this patient's care. Please do not hesitate to call us directly with further questions.      Electronically signed by Booker Mcqueen PA-C on 3/23/2022 at 12:35 PM

## 2022-03-23 NOTE — CARE COORDINATION
3/23/22, 10:38 AM EDT    Patient goals/plan/ treatment preferences discussed by  and . Patient goals/plan/ treatment preferences reviewed with patient/ family. Patient/ family verbalize understanding of discharge plan and are in agreement with goal/plan/treatment preferences. Understanding was demonstrated using the teach back method. AVS provided by RN at time of discharge, which includes all necessary medical information pertaining to the patients current course of illness, treatment, post-discharge goals of care, and treatment preferences. Discharging home with , new RW and bedside commode delivered to room by SR SHIPLEY.

## 2022-03-23 NOTE — PROGRESS NOTES
Patient discharged to home with . Patient left with all belongings, AVS, prescriptions. Patient sent home with [facility] soap as well. All questions answered at this time. Patient has all equipment needed at home already. Pt instructed on importance of maintaining non-weight bearing on the operative leg. Patient has follow up appointments scheduled with PCP and  Dr. Elizabeth Marte.

## 2022-03-24 ENCOUNTER — TELEPHONE (OUTPATIENT)
Dept: FAMILY MEDICINE CLINIC | Age: 52
End: 2022-03-24

## 2022-03-24 ENCOUNTER — CARE COORDINATION (OUTPATIENT)
Dept: CASE MANAGEMENT | Age: 52
End: 2022-03-24

## 2022-03-24 NOTE — CARE COORDINATION
[facility] Transitions Initial Follow Up Call    Call within 2 business days of discharge: Yes    Patient: Khanh Sands Patient : 1970   MRN: [id number]  Reason for Admission: Closed displaced trimalleolar fracture of left ankle, s/p ORIF  Discharge Date: 3/23/22 RARS: Readmission Risk Score: 8.6 ( )      Last Discharge [address] [place] [address]       Complaint Diagnosis Description Type Department Provider    3/20/22 Fall; Ankle Pain; Ankle Injury Closed displaced trimalleolar fracture of left ankle, initial encounter . .. ED to Hosp-Admission (Discharged) (ADMITTED) [facility] Soledad Cantu, MARTÍN; Martin Dooley, ... Spoke with Vanesa Kaur, said she is doing pretty good. Slept  this morning, took pain pills at those times, pain is 4/10 now. She has a hard cast on bottom of foot and a splint, wrapped. Is NWB for 6 wks. Is getting around with walker, crutch or scooter. Has a BSC and tub transfer bench. She is aware of post op instructions on AVS.  Appetite and fluid intake is good. Bowels are moving. Reviewed medications, taking as directed, no questions. Will see podiatry on Monday and has f/u with PCP in April. Denies other needs. No other questions or concerns at this time. Will continue to follow.     Non-face-to-face services provided:  Scheduled appointment with PCP-  Scheduled appointment with Specialist-3/28  Obtained and reviewed discharge summary and/or continuity of care documents    Care Transitions 24 Hour Call    Schedule Follow Up Appointment with PCP: Completed  Do you have any ongoing symptoms?: No  Do you have a copy of your discharge instructions?: Yes  Do you have all of your prescriptions and are they filled?: Yes  Have you been contacted by a [facility] Pharmacist?: No  Have you scheduled your follow up appointment?: Yes  How are you going to get to your appointment?: Car - family or friend to transport  Were you discharged with any Home Care or Post Acute Services: No  Do you feel like you have everything you need to keep you well at home?: Yes  Care Transitions Interventions     Transitions of Care Initial Call      Challenges to be reviewed by the provider   Additional needs identified to be addressed with provider: No  none             Method of communication with provider : none      Advance Care Planning:   Does patient have an Advance Directive: patient declined education. Has a will and will reach out to  or PCP. Was this a readmission? No  Patient stated reason for admission: fall  Patients top risk factors for readmission: medical condition-ankle fx    Care Transition Nurse (CTN) contacted the patient by telephone to perform post hospital discharge assessment. Verified name and  with patient as identifiers. Provided introduction to self, and explanation of the CTN role. CTN reviewed discharge instructions, medical action plan and red flags with patient who verbalized understanding. Patient given an opportunity to ask questions and does not have any further questions or concerns at this time. Were discharge instructions available to patient? Yes. Reviewed appropriate site of care based on symptoms and resources available to patient including: PCP  Specialist. The patient agrees to contact the PCP office for questions related to their healthcare. Medication reconciliation was performed with patient, who verbalizes understanding of administration of home medications. Advised obtaining a 90-day supply of all daily and as-needed medications. Covid Risk Education     Educated patient about risk for severe COVID-19 due to risk factors according to CDC guidelines. CTN reviewed discharge instructions, medical action plan and red flag symptoms with the patient who verbalized understanding. Discussed COVID vaccination status: Yes. Education provided on COVID-19 vaccination as appropriate. Discussed exposure protocols and quarantine with CDC Guidelines.  Patient was given an opportunity to verbalize any questions and concerns and agrees to contact CTN or health care provider for questions related to their healthcare. Reviewed and educated patient on any new and changed medications related to discharge diagnosis. Was patient discharged with a pulse oximeter? No     CTN provided contact information. Plan for follow-up call in 5-7 days based on severity of symptoms and risk factors.   Plan for next call: symptom management-ankle fx, pain management      Follow Up  Future Appointments   Date Time Provider Anmol Abbasi   4/6/2022  9:45 AM Ronn Gomez  [facility] - [place] Higgins, [place]

## 2022-03-24 NOTE — TELEPHONE ENCOUNTER
[facility] Transitions Initial Follow Up Call    Outreach made within 2 business days of discharge: Yes    Patient: Devin Cook Patient : 1970   MRN: [id number]  Reason for Admission: There are no discharge diagnoses documented for the most recent discharge. Discharge Date: 3/23/22       Spoke with: patient    Discharge department/facility: [facility] [place]    TCM Interactive Patient Contact:  Was patient able to fill all prescriptions: Yes  Was patient instructed to bring all medications to the follow-up visit: Yes  Is patient taking all medications as directed in the discharge summary? Yes  Does patient understand their discharge instructions: Yes  Does patient have questions or concerns that need addressed prior to 7-14 day follow up office visit: not at this time. Pt was instructed to call the office back with any questions/concerns.     Scheduled appointment with PCP within 7-14 days    Follow Up  Future Appointments   Date Time Provider Anmol Abbasi   2022  9:45 AM Nayeli Kwan MD [facility] - [place], [place]

## 2022-03-30 ENCOUNTER — CARE COORDINATION (OUTPATIENT)
Dept: CASE MANAGEMENT | Age: 52
End: 2022-03-30

## 2022-03-30 NOTE — CARE COORDINATION
[facility] Transitions Follow Up Call    3/30/2022    Patient: Joo Callahan  Patient : 1970   MRN: [id number]  Reason for Admission: Closed displaced trimalleolar fracture of left ankle, s/p ORIF  Discharge Date: 3/23/22 RARS: Readmission Risk Score: 8.6 ( )         Spoke with Akash Godwin, said she is doing pretty good. Saw podiatrist Monday and was pleased with her progress. Will see again this Monday. Her pain is manageable. She is not taking as much Percocet during the day, using Tylenol instead. Takes Percocet at bedtime. Denies other needs. No other questions or concerns at this time. Will continue to follow. Care Transitions Subsequent and Final Call    Subsequent and Final Calls  Do you have any ongoing symptoms?: No  Have your medications changed?: No  Do you have any questions related to your medications?: No  Do you currently have any active services?: No  Do you have any needs or concerns that I can assist you with?: No  Identified Barriers: Lack of Education  Care Transitions Interventions  Other Interventions:       Care Transitions Follow Up Call    Needs to be reviewed by the provider   Additional needs identified to be addressed with provider: No  none             Method of communication with provider : none      Care Transition Nurse (CTN) contacted the patient by telephone to follow up after admission on 3/20/22. Verified name and  with patient as identifiers. Addressed changes since last contact: none  Discussed follow-up appointments. Advance Care Planning:   Does patient have an Advance Directive: not on file. CTN reviewed discharge instructions, medical action plan and red flags with patient and discussed any barriers to care and/or understanding of plan of care after discharge.  Discussed appropriate site of care based on symptoms and resources available to patient including: PCP  Specialist. The patient agrees to contact the PCP office for questions related to their healthcare. Patients top risk factors for readmission: medical condition-ankle fx  Interventions to address risk factors: Scheduled appointment with Specialist-4/4      [facility] follow up appointment(s): Dr Tessa Graham 4/4    CTN provided contact information for future needs. Plan for follow-up call in 7-10 days based on severity of symptoms and risk factors. Plan for next call: symptom management-ankle fx, pain, final call        Follow Up  No future appointments.     Chris Palacios RN

## 2022-04-08 ENCOUNTER — CARE COORDINATION (OUTPATIENT)
Dept: CASE MANAGEMENT | Age: 52
End: 2022-04-08

## 2022-04-08 NOTE — CARE COORDINATION
[facility] Transitions Follow Up Call:Final CAll    2022    Patient: Madilyn Libman  Patient : 1970   MRN: [id number]  Reason for Admission: fall L ankle fx  Discharge Date: 3/23/22 RARS: Readmission Risk Score: 8.6 ( )         Spoke with: Leeanna Staff today and she said she is doing pretty good. Saw Dr. Andreia Elkins Monday and he said he will do an xray next Monday and perhaps switch her to a boot but still non-wt. Bearing. Pain level yesterday 7/10 taking Percocet. She said on Tuesday her crutch slipped on the metal strip going out from the bathroom and she did fall but did not hurt her ankle, more so her wrist (which is fine now). Denies swelling, dressing intact & dry, good ROM in L ankle. Will report to podiatrist in Monday or go to ER over the weekend if swelling or increased pain etc. Denies constipation. No other concerns voiced at this time. CTN informed her of final call. She expressed appreciation for the care. Care Transitions Subsequent and Final Call    Subsequent and Final Calls  Do you have any ongoing symptoms?: Yes  Onset of Patient-reported symptoms: In the past 7 days  Patient-reported symptoms: Pain  Interventions for patient-reported symptoms: Other  Have your medications changed?: No  Do you have any questions related to your medications?: No  Do you currently have any active services?: No  Do you have any needs or concerns that I can assist you with?: No  Identified Barriers: Lack of Education  Care Transitions Interventions  No Identified Needs  Other Interventions:         Care Transitions Follow Up Call    Needs to be reviewed by the provider   Additional needs identified to be addressed with provider: No  none             Method of communication with provider : none      Care Transition Nurse (CTN) contacted the patient by telephone to follow up after admission on 3/20/22. Verified name and  with patient as identifiers.     Addressed changes since last contact: none  Discussed follow-up appointments. If no appointment was previously scheduled, appointment scheduling offered: Yes. Is follow up appointment scheduled within 7 days of discharge? Yes. Advance Care Planning:   Does patient have an Advance Directive: not on file. CTN reviewed discharge instructions, medical action plan and red flags with patient and discussed any barriers to care and/or understanding of plan of care after discharge. Discussed appropriate site of care based on symptoms and resources available to patient including: Specialist. The patient agrees to contact the PCP office for questions related to their healthcare. Patients top risk factors for readmission: functional physical ability  lack of knowledge about disease  medical condition-L ankle fx  Interventions to address risk factors: Scheduled appointment with Specialist-4/11/22      Non-[facility] follow up appointment(s):     CTN provided contact information for future needs. No further follow-up call indicated based on severity of symptoms and risk factors. Plan for next call: none        Follow Up  No future appointments.     Cole Escobar RN

## 2022-05-04 RX ORDER — OMEPRAZOLE 40 MG/1
CAPSULE, DELAYED RELEASE ORAL
Qty: 90 CAPSULE | Refills: 0 | Status: SHIPPED | OUTPATIENT
Start: 2022-05-04 | End: 2022-10-25 | Stop reason: SDUPTHER

## 2022-05-06 LAB
CHOLESTEROL, TOTAL: 187 MG/DL
CHOLESTEROL/HDL RATIO: 2.8
GLUCOSE BLD-MCNC: 97 MG/DL
HDLC SERPL-MCNC: 68 MG/DL (ref 35–70)
LDL CHOLESTEROL CALCULATED: 103 MG/DL (ref 0–160)
NONHDLC SERPL-MCNC: NORMAL MG/DL
TRIGL SERPL-MCNC: 88 MG/DL
TSH SERPL DL<=0.05 MIU/L-ACNC: 1.52 UIU/ML
VLDLC SERPL CALC-MCNC: NORMAL MG/DL

## 2022-10-25 ENCOUNTER — OFFICE VISIT (OUTPATIENT)
Dept: FAMILY MEDICINE CLINIC | Age: 52
End: 2022-10-25
Payer: COMMERCIAL

## 2022-10-25 VITALS
HEART RATE: 55 BPM | OXYGEN SATURATION: 99 % | SYSTOLIC BLOOD PRESSURE: 110 MMHG | DIASTOLIC BLOOD PRESSURE: 82 MMHG | BODY MASS INDEX: 35.99 KG/M2 | WEIGHT: 223 LBS

## 2022-10-25 DIAGNOSIS — L03.011 ACUTE PARONYCHIA OF FINGER OF RIGHT HAND: Primary | ICD-10-CM

## 2022-10-25 PROCEDURE — 99213 OFFICE O/P EST LOW 20 MIN: CPT | Performed by: NURSE PRACTITIONER

## 2022-10-25 PROCEDURE — G8427 DOCREV CUR MEDS BY ELIG CLIN: HCPCS | Performed by: NURSE PRACTITIONER

## 2022-10-25 PROCEDURE — G8417 CALC BMI ABV UP PARAM F/U: HCPCS | Performed by: NURSE PRACTITIONER

## 2022-10-25 PROCEDURE — 1036F TOBACCO NON-USER: CPT | Performed by: NURSE PRACTITIONER

## 2022-10-25 PROCEDURE — 3017F COLORECTAL CA SCREEN DOC REV: CPT | Performed by: NURSE PRACTITIONER

## 2022-10-25 PROCEDURE — G8484 FLU IMMUNIZE NO ADMIN: HCPCS | Performed by: NURSE PRACTITIONER

## 2022-10-25 RX ORDER — OMEPRAZOLE 40 MG/1
CAPSULE, DELAYED RELEASE ORAL
Qty: 90 CAPSULE | Refills: 3 | Status: SHIPPED | OUTPATIENT
Start: 2022-10-25 | End: 2022-11-10

## 2022-10-25 RX ORDER — CEPHALEXIN 500 MG/1
500 CAPSULE ORAL 3 TIMES DAILY
Qty: 30 CAPSULE | Refills: 0 | Status: SHIPPED | OUTPATIENT
Start: 2022-10-25

## 2022-10-25 RX ORDER — ESCITALOPRAM OXALATE 10 MG/1
10 TABLET ORAL DAILY
Qty: 90 TABLET | Refills: 3 | Status: SHIPPED | OUTPATIENT
Start: 2022-10-25 | End: 2022-10-27 | Stop reason: SDUPTHER

## 2022-10-25 ASSESSMENT — ENCOUNTER SYMPTOMS
ABDOMINAL PAIN: 0
ABDOMINAL DISTENTION: 0
WHEEZING: 0
VOMITING: 0
STRIDOR: 0
SHORTNESS OF BREATH: 0
COLOR CHANGE: 0
CHEST TIGHTNESS: 0
BACK PAIN: 0
SORE THROAT: 0
COUGH: 0
CONSTIPATION: 0
DIARRHEA: 0
NAUSEA: 0
SINUS PRESSURE: 0

## 2022-10-25 NOTE — PROGRESS NOTES
Vale Viveros (:  1970) is a 45 y.o. female,Established patient, here for evaluation of the following chief complaint(s):  Finger Pain (Sore on finger right hand long finger infected started noticing it two days ago. Swollen and red)         ASSESSMENT/PLAN:  1. Acute paronychia of finger of right hand    Keflex as prescribed  Epsom salt soaks  tylenol    Return if symptoms worsen or fail to improve. Subjective   SUBJECTIVE/OBJECTIVE:  HPI    Right middle finger red and swollen around nail. Started yesterday. Very painful. Antibacterial ointment. Getting worse. Review of Systems   Constitutional:  Negative for activity change, appetite change, chills, diaphoresis, fatigue, fever and unexpected weight change. HENT:  Negative for congestion, ear pain, postnasal drip, sinus pressure and sore throat. Eyes:  Negative for visual disturbance. Respiratory:  Negative for cough, chest tightness, shortness of breath, wheezing and stridor. Cardiovascular:  Negative for chest pain, palpitations and leg swelling. Gastrointestinal:  Negative for abdominal distention, abdominal pain, constipation, diarrhea, nausea and vomiting. Endocrine: Negative for polydipsia, polyphagia and polyuria. Genitourinary:  Negative for decreased urine volume, difficulty urinating, dysuria, flank pain, frequency, hematuria and urgency. Musculoskeletal:  Negative for arthralgias, back pain, gait problem, joint swelling, myalgias and neck pain. Skin:  Positive for wound. Negative for color change, pallor and rash. Neurological:  Negative for dizziness, syncope, weakness, light-headedness, numbness and headaches. Hematological:  Negative for adenopathy. Psychiatric/Behavioral:  Negative for behavioral problems, self-injury and sleep disturbance. The patient is not nervous/anxious. Objective   Physical Exam  Constitutional:       Appearance: Normal appearance.    HENT:      Head: Normocephalic and atraumatic. Nose: Nose normal.      Mouth/Throat:      Mouth: Mucous membranes are moist.      Pharynx: Oropharynx is clear. No oropharyngeal exudate or posterior oropharyngeal erythema. Cardiovascular:      Rate and Rhythm: Normal rate and regular rhythm. Pulmonary:      Effort: Pulmonary effort is normal.      Breath sounds: Normal breath sounds. Skin:     General: Skin is warm and dry. Findings: Erythema present. Comments: Paronychia right middle finger. Neurological:      Mental Status: She is alert and oriented to person, place, and time. On this date 10/25/2022 I have spent 25 minutes reviewing previous notes, test results and face to face with the patient discussing the diagnosis and importance of compliance with the treatment plan as well as documenting on the day of the visit. An electronic signature was used to authenticate this note.     --STEPHANY Montano - CNP

## 2022-10-26 ENCOUNTER — PATIENT MESSAGE (OUTPATIENT)
Dept: FAMILY MEDICINE CLINIC | Age: 52
End: 2022-10-26

## 2022-10-27 RX ORDER — ESCITALOPRAM OXALATE 10 MG/1
10 TABLET ORAL DAILY
Qty: 90 TABLET | Refills: 3 | Status: SHIPPED | OUTPATIENT
Start: 2022-10-27

## 2022-10-27 NOTE — TELEPHONE ENCOUNTER
From: Pa Jose Angel  To: Krunal Contreras  Sent: 10/26/2022 9:40 AM EDT  Subject: Lexapro     I know a refill was sent to [facility] but I actually need it sent to [facility] instead. Can it be redone? Sorry about the mixup.  Thanks Massiel Gillette

## 2022-11-10 RX ORDER — OMEPRAZOLE 40 MG/1
CAPSULE, DELAYED RELEASE ORAL
Qty: 90 CAPSULE | Refills: 3 | Status: SHIPPED | OUTPATIENT
Start: 2022-11-10

## 2022-12-14 ENCOUNTER — OFFICE VISIT (OUTPATIENT)
Dept: FAMILY MEDICINE CLINIC | Age: 52
End: 2022-12-14

## 2022-12-14 VITALS
WEIGHT: 223 LBS | OXYGEN SATURATION: 96 % | DIASTOLIC BLOOD PRESSURE: 84 MMHG | SYSTOLIC BLOOD PRESSURE: 118 MMHG | HEART RATE: 80 BPM | BODY MASS INDEX: 35.99 KG/M2 | RESPIRATION RATE: 18 BRPM

## 2022-12-14 DIAGNOSIS — M77.9 TENDONITIS: Primary | ICD-10-CM

## 2022-12-14 RX ORDER — TIZANIDINE 4 MG/1
TABLET ORAL
COMMUNITY
Start: 2022-11-15

## 2022-12-14 RX ORDER — TRIAMCINOLONE ACETONIDE 40 MG/ML
60 INJECTION, SUSPENSION INTRA-ARTICULAR; INTRAMUSCULAR ONCE
Status: COMPLETED | OUTPATIENT
Start: 2022-12-14 | End: 2022-12-14

## 2022-12-14 RX ADMIN — TRIAMCINOLONE ACETONIDE 60 MG: 40 INJECTION, SUSPENSION INTRA-ARTICULAR; INTRAMUSCULAR at 14:41

## 2022-12-14 NOTE — PROGRESS NOTES
After obtaining consent, and per orders of OK Wolfe, injection  by Danny Perdomo MA. Patient instructed to remain in clinic for 20 minutes afterwards, and to report any adverse reaction to me immediately. Administrations This Visit       triamcinolone acetonide (KENALOG-40) injection 60 mg       Admin Date  12/14/2022  14:41 Action  Given Dose  60 mg Route  IntraMUSCular Site  Dorsogluteal Right Administered By  Danny Perdomo MA    Ordering Provider: Mennie Councilman, APRN - CNP    NDC: 8789-7182-00    Lot#: [id number]    : [employer] (PRIMARY CARE)    Patient Supplied?: No                    Patient tolerated well.

## 2022-12-14 NOTE — PROGRESS NOTES
Daily Mcguire (:  1970) is a 45 y.o. female,Established patient, here for evaluation of the following chief complaint(s): Foot Pain (Right foot x 2 months- had been on a steroid for about 1 month went away and came back)         ASSESSMENT/PLAN:  1. Tendonitis  -     triamcinolone acetonide (KENALOG-40) injection 60 mg; 60 mg, IntraMUSCular, ONCE, 1 dose, On 22 at 1415    Kenalog injection  Avoid long distance walking  Tylenol for pain  Wear good shoes with arches    Return if symptoms worsen or fail to improve. Subjective   SUBJECTIVE/OBJECTIVE:  HPI    Right foot pain. Podiatry and given steroids for 10 days in september. Pain got better. This time started over last couple weeks. Using right side more since breaking ankle earlier this year. No injury. Can not take NSAIDS since gastric sleeve surgery. Review of Systems   Musculoskeletal:  Positive for arthralgias, gait problem and myalgias. Objective   Physical Exam  Musculoskeletal:         General: Tenderness present. No swelling, deformity or signs of injury. Normal range of motion. On this date 2022 I have spent 23 minutes reviewing previous notes, test results and face to face with the patient discussing the diagnosis and importance of compliance with the treatment plan as well as documenting on the day of the visit. An electronic signature was used to authenticate this note.     --STEPHANY Hamilton - CNP

## 2023-06-05 ENCOUNTER — PATIENT MESSAGE (OUTPATIENT)
Dept: FAMILY MEDICINE CLINIC | Age: 53
End: 2023-06-05

## 2023-06-05 DIAGNOSIS — L30.9 DERMATITIS: ICD-10-CM

## 2023-06-05 RX ORDER — CLOBETASOL PROPIONATE 0.5 MG/G
OINTMENT TOPICAL
Qty: 30 G | Refills: 0 | Status: SHIPPED | OUTPATIENT
Start: 2023-06-05

## 2023-06-05 NOTE — TELEPHONE ENCOUNTER
From: Dory Perry  To: Linette Montanez  Sent: 6/5/2023 9:26 AM EDT  Subject: Refill of ointment    Its been awhile since I got a refill for this but Id appreciate if I could get another tube. Clobestasol Propionate   USP 0.05%  Since its been awhile, if you have another suggestion, Id be ok with that. Its a scratching too much rash on my leg. I get it every year during the winter but now its going into spring.      Thanks  Mekhi Vásquez

## 2023-11-05 RX ORDER — ESCITALOPRAM OXALATE 10 MG/1
10 TABLET ORAL DAILY
Qty: 90 TABLET | Refills: 3 | Status: SHIPPED | OUTPATIENT
Start: 2023-11-05

## 2024-01-08 RX ORDER — OMEPRAZOLE 40 MG/1
CAPSULE, DELAYED RELEASE ORAL
Qty: 90 CAPSULE | Refills: 3 | Status: SHIPPED | OUTPATIENT
Start: 2024-01-08

## 2024-01-08 NOTE — TELEPHONE ENCOUNTER
Last appointment this department: 12/18/2023  Next appointment this department: Visit date not found

## 2024-04-07 NOTE — TELEPHONE ENCOUNTER
Called spoke to the pt let her know that she can get the xray done. Pt verbally understood. stretcher

## 2024-04-25 ENCOUNTER — HOSPITAL ENCOUNTER (OUTPATIENT)
Dept: MAMMOGRAPHY | Age: 54
Discharge: HOME | End: 2024-04-25
Payer: COMMERCIAL

## 2024-04-25 DIAGNOSIS — Z12.39 BREAST SCREENING: ICD-10-CM

## 2024-04-25 PROCEDURE — 77067 SCR MAMMO BI INCL CAD: CPT

## 2024-11-12 ENCOUNTER — OFFICE VISIT (OUTPATIENT)
Dept: FAMILY MEDICINE CLINIC | Age: 54
End: 2024-11-12
Payer: COMMERCIAL

## 2024-11-12 VITALS
SYSTOLIC BLOOD PRESSURE: 122 MMHG | HEIGHT: 66 IN | RESPIRATION RATE: 16 BRPM | WEIGHT: 243 LBS | OXYGEN SATURATION: 99 % | HEART RATE: 71 BPM | BODY MASS INDEX: 39.05 KG/M2 | TEMPERATURE: 97.9 F | DIASTOLIC BLOOD PRESSURE: 86 MMHG

## 2024-11-12 DIAGNOSIS — R19.7 INTERMITTENT DIARRHEA: Primary | ICD-10-CM

## 2024-11-12 DIAGNOSIS — L30.9 DERMATITIS: ICD-10-CM

## 2024-11-12 DIAGNOSIS — Z98.84 H/O GASTRIC BYPASS: ICD-10-CM

## 2024-11-12 DIAGNOSIS — K21.9 GASTROESOPHAGEAL REFLUX DISEASE WITHOUT ESOPHAGITIS: ICD-10-CM

## 2024-11-12 PROCEDURE — 99213 OFFICE O/P EST LOW 20 MIN: CPT

## 2024-11-12 PROCEDURE — G8427 DOCREV CUR MEDS BY ELIG CLIN: HCPCS

## 2024-11-12 PROCEDURE — G8417 CALC BMI ABV UP PARAM F/U: HCPCS

## 2024-11-12 PROCEDURE — G8484 FLU IMMUNIZE NO ADMIN: HCPCS

## 2024-11-12 PROCEDURE — 1036F TOBACCO NON-USER: CPT

## 2024-11-12 PROCEDURE — 3017F COLORECTAL CA SCREEN DOC REV: CPT

## 2024-11-12 RX ORDER — HYDROXYZINE HYDROCHLORIDE 25 MG/1
25 TABLET, FILM COATED ORAL NIGHTLY
COMMUNITY
Start: 2024-09-05

## 2024-11-12 RX ORDER — CLOBETASOL PROPIONATE 0.5 MG/G
OINTMENT TOPICAL
Qty: 30 G | Refills: 0 | Status: SHIPPED | OUTPATIENT
Start: 2024-11-12

## 2024-11-12 ASSESSMENT — ENCOUNTER SYMPTOMS
DIARRHEA: 1
ABDOMINAL PAIN: 1
BLOOD IN STOOL: 0
COUGH: 0
SHORTNESS OF BREATH: 0
NAUSEA: 1
ABDOMINAL DISTENTION: 0
ANAL BLEEDING: 0
VOMITING: 1
RECTAL PAIN: 0
CONSTIPATION: 0
SORE THROAT: 0

## 2024-11-12 NOTE — PROGRESS NOTES
prescription for cortisone. She has previously used clobetasol, which she found effective.    FAMILY HISTORY  Her mother passed of colon cancer.    Review of Systems   Constitutional:  Negative for chills and fever.   HENT:  Negative for congestion and sore throat.    Respiratory:  Negative for cough and shortness of breath.    Cardiovascular:  Negative for chest pain and palpitations.   Gastrointestinal:  Positive for abdominal pain, diarrhea, nausea and vomiting. Negative for abdominal distention, anal bleeding, blood in stool, constipation and rectal pain.        Acid reflux     Genitourinary:  Negative for dysuria and hematuria.   Musculoskeletal:  Negative for myalgias.   Neurological:  Negative for headaches.   Psychiatric/Behavioral:  The patient is not nervous/anxious.           Objective   Blood pressure 122/86, pulse 71, temperature 97.9 °F (36.6 °C), temperature source Temporal, resp. rate 16, height 1.676 m (5' 6\"), weight 110.2 kg (243 lb), SpO2 99%.  Physical Exam       Physical Exam  Vitals and nursing note reviewed.   Constitutional:       Appearance: She is well-developed.   HENT:      Head: Normocephalic and atraumatic.   Eyes:      General: No scleral icterus.        Right eye: No discharge.         Left eye: No discharge.      Conjunctiva/sclera: Conjunctivae normal.   Cardiovascular:      Rate and Rhythm: Normal rate and regular rhythm.      Heart sounds: Normal heart sounds.   Pulmonary:      Effort: Pulmonary effort is normal.      Breath sounds: Normal breath sounds. No wheezing.   Abdominal:      General: Bowel sounds are normal. There is no distension.      Palpations: Abdomen is soft. There is no mass.      Tenderness: There is no abdominal tenderness. There is no right CVA tenderness, left CVA tenderness or rebound.      Hernia: No hernia is present.   Skin:     General: Skin is warm and dry.   Neurological:      Mental Status: She is alert and oriented to person, place, and time. Mental

## 2025-01-02 ENCOUNTER — OFFICE VISIT (OUTPATIENT)
Dept: FAMILY MEDICINE CLINIC | Age: 55
End: 2025-01-02

## 2025-01-02 VITALS
WEIGHT: 243 LBS | BODY MASS INDEX: 39.05 KG/M2 | SYSTOLIC BLOOD PRESSURE: 122 MMHG | OXYGEN SATURATION: 95 % | TEMPERATURE: 98.4 F | HEIGHT: 66 IN | HEART RATE: 95 BPM | DIASTOLIC BLOOD PRESSURE: 86 MMHG | RESPIRATION RATE: 16 BRPM

## 2025-01-02 DIAGNOSIS — J02.9 SORE THROAT: Primary | ICD-10-CM

## 2025-01-02 DIAGNOSIS — J02.0 STREP THROAT: ICD-10-CM

## 2025-01-02 LAB — STREPTOCOCCUS A RNA: POSITIVE

## 2025-01-02 RX ORDER — SODIUM, POTASSIUM,MAG SULFATES 17.5-3.13G
SOLUTION, RECONSTITUTED, ORAL ORAL
COMMUNITY
Start: 2024-12-17

## 2025-01-02 RX ORDER — AMOXICILLIN 500 MG/1
500 CAPSULE ORAL 2 TIMES DAILY
Qty: 20 CAPSULE | Refills: 0 | Status: SHIPPED | OUTPATIENT
Start: 2025-01-02 | End: 2025-01-12

## 2025-01-02 SDOH — ECONOMIC STABILITY: FOOD INSECURITY: WITHIN THE PAST 12 MONTHS, THE FOOD YOU BOUGHT JUST DIDN'T LAST AND YOU DIDN'T HAVE MONEY TO GET MORE.: NEVER TRUE

## 2025-01-02 SDOH — ECONOMIC STABILITY: INCOME INSECURITY: HOW HARD IS IT FOR YOU TO PAY FOR THE VERY BASICS LIKE FOOD, HOUSING, MEDICAL CARE, AND HEATING?: NOT HARD AT ALL

## 2025-01-02 SDOH — ECONOMIC STABILITY: FOOD INSECURITY: WITHIN THE PAST 12 MONTHS, YOU WORRIED THAT YOUR FOOD WOULD RUN OUT BEFORE YOU GOT MONEY TO BUY MORE.: NEVER TRUE

## 2025-01-02 ASSESSMENT — PATIENT HEALTH QUESTIONNAIRE - PHQ9
SUM OF ALL RESPONSES TO PHQ QUESTIONS 1-9: 0
5. POOR APPETITE OR OVEREATING: NOT AT ALL
4. FEELING TIRED OR HAVING LITTLE ENERGY: NOT AT ALL
SUM OF ALL RESPONSES TO PHQ9 QUESTIONS 1 & 2: 0
7. TROUBLE CONCENTRATING ON THINGS, SUCH AS READING THE NEWSPAPER OR WATCHING TELEVISION: NOT AT ALL
8. MOVING OR SPEAKING SO SLOWLY THAT OTHER PEOPLE COULD HAVE NOTICED. OR THE OPPOSITE, BEING SO FIGETY OR RESTLESS THAT YOU HAVE BEEN MOVING AROUND A LOT MORE THAN USUAL: NOT AT ALL
2. FEELING DOWN, DEPRESSED OR HOPELESS: NOT AT ALL
1. LITTLE INTEREST OR PLEASURE IN DOING THINGS: NOT AT ALL
SUM OF ALL RESPONSES TO PHQ QUESTIONS 1-9: 0
6. FEELING BAD ABOUT YOURSELF - OR THAT YOU ARE A FAILURE OR HAVE LET YOURSELF OR YOUR FAMILY DOWN: NOT AT ALL
SUM OF ALL RESPONSES TO PHQ QUESTIONS 1-9: 0
10. IF YOU CHECKED OFF ANY PROBLEMS, HOW DIFFICULT HAVE THESE PROBLEMS MADE IT FOR YOU TO DO YOUR WORK, TAKE CARE OF THINGS AT HOME, OR GET ALONG WITH OTHER PEOPLE: NOT DIFFICULT AT ALL
9. THOUGHTS THAT YOU WOULD BE BETTER OFF DEAD, OR OF HURTING YOURSELF: NOT AT ALL
SUM OF ALL RESPONSES TO PHQ QUESTIONS 1-9: 0
3. TROUBLE FALLING OR STAYING ASLEEP: NOT AT ALL

## 2025-01-02 ASSESSMENT — ENCOUNTER SYMPTOMS
WHEEZING: 0
SORE THROAT: 1
ABDOMINAL PAIN: 0
COUGH: 1
RHINORRHEA: 0
CONSTIPATION: 0
NAUSEA: 0
DIARRHEA: 0
SHORTNESS OF BREATH: 0

## 2025-01-02 NOTE — PROGRESS NOTES
[facility]  [address]   [facility] [address]  Dept: [phone]  Loc: [phone]     David Russell (:  1970) is a 54 y.o. female, here for evaluation of the following chief complaint(s):  Cough (X 2 weeks , runny nose , cough , sore throat )      ASSESSMENT/PLAN:  1. Sore throat  -     POCT Rapid Strep A DNA  2. Strep throat  -     amoxicillin (AMOXIL) 500 MG capsule; Take 1 capsule by mouth 2 times daily for 10 days, Disp-20 capsule, R-0Normal  Strep +  Amox given    No follow-ups on file.    SUBJECTIVE/OBJECTIVE:  Cough  This is a new problem. The current episode started 1 to 4 weeks ago. The problem has been gradually worsening. The problem occurs every few minutes. The cough is Non-productive. Associated symptoms include ear pain, headaches, myalgias, nasal congestion, postnasal drip and a sore throat. Pertinent negatives include no chest pain, fever, rhinorrhea, shortness of breath, sweats or wheezing. Nothing aggravates the symptoms. She has tried OTC cough suppressant for the symptoms. The treatment provided mild relief. There is no history of emphysema.       Review of Systems   Constitutional:  Positive for activity change, appetite change and fatigue. Negative for fever.   HENT:  Positive for congestion, ear pain, postnasal drip and sore throat. Negative for rhinorrhea.    Respiratory:  Positive for cough. Negative for shortness of breath and wheezing.    Cardiovascular:  Negative for chest pain and palpitations.   Gastrointestinal:  Negative for abdominal pain, constipation, diarrhea and nausea.   Genitourinary:  Negative for dysuria and hematuria.   Musculoskeletal:  Positive for myalgias. Negative for arthralgias.   Neurological:  Positive for headaches. Negative for dizziness.   Psychiatric/Behavioral:  Negative for sleep disturbance. The patient is not nervous/anxious.        Physical Exam  Vitals and nursing

## 2025-01-04 ENCOUNTER — HOSPITAL ENCOUNTER (EMERGENCY)
Age: 55
Discharge: HOME | End: 2025-01-04
Payer: COMMERCIAL

## 2025-01-04 VITALS
OXYGEN SATURATION: 100 % | BODY MASS INDEX: 39.05 KG/M2 | WEIGHT: 243 LBS | DIASTOLIC BLOOD PRESSURE: 80 MMHG | SYSTOLIC BLOOD PRESSURE: 140 MMHG | TEMPERATURE: 96.8 F | HEIGHT: 66 IN | HEART RATE: 97 BPM | RESPIRATION RATE: 16 BRPM

## 2025-01-04 DIAGNOSIS — R21 RASH AND OTHER NONSPECIFIC SKIN ERUPTION: Primary | ICD-10-CM

## 2025-01-04 PROCEDURE — 99213 OFFICE O/P EST LOW 20 MIN: CPT

## 2025-01-04 RX ORDER — PREDNISONE 20 MG/1
TABLET ORAL
Qty: 11 TABLET | Refills: 0 | Status: SHIPPED | OUTPATIENT
Start: 2025-01-04 | End: 2025-01-12

## 2025-01-04 ASSESSMENT — PAIN - FUNCTIONAL ASSESSMENT: PAIN_FUNCTIONAL_ASSESSMENT: NONE - DENIES PAIN

## 2025-01-04 NOTE — ED TRIAGE NOTES
Patient ambulated to room with complaint of allergic reaction to amoxil. States she has taken amoxil for 4 days and noticed bumps on her abdomen yesterday that are tender to touch

## 2025-01-04 NOTE — DISCHARGE INSTRUCTIONS
This is not a strep rash or amoxicillin rash  This looks like bites or irritation of some sort  Benadryl orally as needed for reaction  Prednisone as prescribed  Continue antibiotics  Follow up with PCP as needed if progressing

## 2025-01-04 NOTE — ED PROVIDER NOTES
[facility] URGENT CARE  Urgent Care Encounter       CHIEF COMPLAINT       Chief Complaint   Patient presents with    Rash     3 red bumps on abdomen       Nurses Notes reviewed and I agree except as noted in the HPI.  HISTORY OF PRESENT ILLNESS   David Russell is a 55 y.o. female who presents with complaints of rash on abdomen.  Patient reports that she woke up with 2 bumps on her left side of abdomen, and 1 bump on her right side of her abdomen.  Patient reports that they are tender, but not itchy.  Patient reports being on amoxicillin for 4 days for strep throat.    The history is provided by the patient.       REVIEW OF SYSTEMS     Review of Systems   Skin:  Positive for rash.   All other systems reviewed and are negative.      PAST MEDICAL HISTORY         Diagnosis Date    Depression        SURGICALHISTORY     Patient  has a past surgical history that includes Tonsillectomy; Gastric Band; Tubal ligation; and Ankle fracture surgery (Left, 3/21/2022).    CURRENT MEDICATIONS       Previous Medications    AMOXICILLIN (AMOXIL) 500 MG CAPSULE    Take 1 capsule by mouth 2 times daily for 10 days    CLOBETASOL (TEMOVATE) 0.05 % OINTMENT    Apply topically 2 times daily.    ESCITALOPRAM (LEXAPRO) 10 MG TABLET    TAKE 1 TABLET BY MOUTH DAILY    HYDROXYZINE HCL (ATARAX) 25 MG TABLET    Take 1 tablet by mouth nightly at bedtime.    MULTIPLE VITAMINS-MINERALS (MULTIVITAMIN ADULT PO)    Take by mouth    OMEPRAZOLE (PRILOSEC) 40 MG DELAYED RELEASE CAPSULE    TAKE 1 CAPSULE BY MOUTH EVERY MORNING BEFORE BREAKFAST    SODIUM-POTASSIUM-MAG SULFATE (SUPREP) 17.5-3.13-1.6 GM/177ML SOLN SOLUTION    TAKE AS DIRECTED FOR 1 DAY    TIZANIDINE (ZANAFLEX) 4 MG TABLET    TAKE 1 TABLET BY MOUTH THREE TIMES DAILY       ALLERGIES     Patient is has No Known Allergies.    Patients   Immunization History   Administered Date(s) Administered    COVID-19, PFIZER PURPLE top, DILUTE for use, (age 12 y+), 30mcg/0.3mL 04/23/2021,  05/13/2021    TDaP, ADACEL (age 10y-64y), BOOSTRIX (age 10y+), IM, 0.5mL 06/12/2014       FAMILY HISTORY     Patient's family history includes Alcohol Abuse in her father; Cancer in her sister; Cancer (age of onset: 56) in her mother; Other in her father.    SOCIAL HISTORY     Patient  reports that she has never smoked. She has never used smokeless tobacco. She reports current alcohol use of about 1.0 standard drink of alcohol per week. She reports that she does not use drugs.    PHYSICAL EXAM     ED TRIAGE VITALS  BP: (!) 140/80, Temp: 96.8 °F (36 °C), Pulse: 97, Respirations: 16, SpO2: 100 %,Estimated body mass index is 39.22 kg/m² as calculated from the following:    Height as of this encounter: 1.676 m (5' 6\").    Weight as of this encounter: 110.2 kg (243 lb).,No LMP recorded. Patient is postmenopausal.    Physical Exam  Vitals and nursing note reviewed.   Constitutional:       Appearance: She is obese.   Cardiovascular:      Rate and Rhythm: Normal rate and regular rhythm.      Heart sounds: Normal heart sounds.   Pulmonary:      Effort: Pulmonary effort is normal.      Breath sounds: Normal breath sounds.   Skin:     Findings: Rash present. No erythema. Rash is papular.          Neurological:      Mental Status: She is alert.         DIAGNOSTIC RESULTS     Labs:No results found for this visit on 01/04/25.    IMAGING:    No orders to display         EKG:      URGENT CARE COURSE:     Vitals:    01/04/25 1113   BP: (!) 140/80   Pulse: 97   Resp: 16   Temp: 96.8 °F (36 °C)   TempSrc: Temporal   SpO2: 100%   Weight: 110.2 kg (243 lb)   Height: 1.676 m (5' 6\")       Medications - No data to display         PROCEDURES:  None    FINAL IMPRESSION      1. Rash and other nonspecific skin eruption          DISPOSITION/ PLAN   Patient has 3 papular spots on abdomen, this more looks like irritation or bite marks of some sort.  Patient reports that her dogs to sleep in bed with her.  Patient does report that intermittently

## 2025-01-08 RX ORDER — OMEPRAZOLE 40 MG/1
CAPSULE, DELAYED RELEASE ORAL
Qty: 90 CAPSULE | Refills: 3 | OUTPATIENT
Start: 2025-01-08

## 2025-01-08 RX ORDER — ESCITALOPRAM OXALATE 10 MG/1
10 TABLET ORAL DAILY
Qty: 90 TABLET | Refills: 3 | Status: SHIPPED | OUTPATIENT
Start: 2025-01-08

## 2025-01-08 NOTE — TELEPHONE ENCOUNTER
Patient's last appointment was : 1/2/2025  Patient's next appointment is : Visit date not found  Last refilled: 11/5/2023      Omeprazole filled 1/8/24

## 2025-01-14 ENCOUNTER — TRANSCRIBE ORDERS (OUTPATIENT)
Dept: ADMINISTRATIVE | Age: 55
End: 2025-01-14

## 2025-01-14 DIAGNOSIS — Z98.84 HISTORY OF BARIATRIC SURGERY: ICD-10-CM

## 2025-01-14 DIAGNOSIS — R13.10 DYSPHAGIA, UNSPECIFIED TYPE: Primary | ICD-10-CM

## 2025-01-17 ENCOUNTER — HOSPITAL ENCOUNTER (OUTPATIENT)
Dept: GENERAL RADIOLOGY | Age: 55
Discharge: HOME | End: 2025-01-17
Attending: INTERNAL MEDICINE
Payer: COMMERCIAL

## 2025-01-17 DIAGNOSIS — R13.10 DYSPHAGIA, UNSPECIFIED TYPE: ICD-10-CM

## 2025-01-17 DIAGNOSIS — Z98.84 HISTORY OF BARIATRIC SURGERY: ICD-10-CM

## 2025-01-17 PROCEDURE — 74240 X-RAY XM UPR GI TRC 1CNTRST: CPT

## 2025-01-17 PROCEDURE — 2500000003 HC RX 250 WO HCPCS: Performed by: INTERNAL MEDICINE

## 2025-01-17 PROCEDURE — 6370000000 HC RX 637 (ALT 250 FOR IP): Performed by: INTERNAL MEDICINE

## 2025-01-17 RX ADMIN — BARIUM SULFATE 140 ML: 980 POWDER, FOR SUSPENSION ORAL at 10:06

## 2025-01-17 RX ADMIN — ANTACID/ANTIFLATULENT 1 EACH: 380; 550; 10; 10 GRANULE, EFFERVESCENT ORAL at 10:07

## 2025-01-23 RX ORDER — OMEPRAZOLE 40 MG/1
CAPSULE, DELAYED RELEASE ORAL
Qty: 90 CAPSULE | Refills: 3 | Status: SHIPPED | OUTPATIENT
Start: 2025-01-23

## 2025-01-23 NOTE — TELEPHONE ENCOUNTER
This medication refill is regarding a electronic request.  Refill requested by  pharmacy .    Requested Prescriptions     Pending Prescriptions Disp Refills    omeprazole (PRILOSEC) 40 MG delayed release capsule [Pharmacy Med Name: OMEPRAZOLE CAP 40MG] 90 capsule 3     Sig: TAKE 1 CAPSULE BY MOUTH EVERY MORNING BEFORE BREAKFAST       Date of last visit: 1/2/2025  Date of next visit: Visit date not found  Date of last refill:   Pharmacy Name: [facility]

## 2025-02-06 ENCOUNTER — PATIENT MESSAGE (OUTPATIENT)
Dept: FAMILY MEDICINE CLINIC | Age: 55
End: 2025-02-06

## 2025-02-06 DIAGNOSIS — M62.838 MUSCLE SPASM: Primary | ICD-10-CM

## (undated) DEVICE — GAUZE,SPONGE,8"X4",12PLY,XRAY,STRL,LF: Brand: MEDLINE

## (undated) DEVICE — BANDAGE COMPR W6INXL12FT SMOOTH FOR LIMB EXSANG ESMARCH

## (undated) DEVICE — SPONGE LAP W18XL18IN WHT COT 4 PLY FLD STRUNG RADPQ DISP ST

## (undated) DEVICE — BASIC SINGLE BASIN BTC-LF: Brand: MEDLINE INDUSTRIES, INC.

## (undated) DEVICE — PADDING,UNDERCAST,COTTON, 4"X4YD STERILE: Brand: MEDLINE

## (undated) DEVICE — BANDAGE COMPR E SGL LAYERED CLSR BGE W/ CLP W4INXL15FT

## (undated) DEVICE — PAD,ABDOMINAL,5"X9",ST,LF,25/BX: Brand: MEDLINE INDUSTRIES, INC.

## (undated) DEVICE — 2.5MM PROVISIONAL FIXATION PIN - 14MM: Brand: EVOS

## (undated) DEVICE — EVOS SMALL 2.5MM DRILL W/AO QC LONG: Brand: EVOS

## (undated) DEVICE — IMPREGNATED GAUZE DRESSING: Brand: CUTICERIN 7.5X20CM CTN 50

## (undated) DEVICE — SUTURE ETHLN SZ 3-0 L30IN NONABSORBABLE BLK FSL L30MM 3/8 1671H

## (undated) DEVICE — C-ARM: Brand: UNBRANDED

## (undated) DEVICE — GUIDE PIN BAYONET POINT 1.3MM X 140MM

## (undated) DEVICE — SUTURE NONABSORBABLE MONOFILAMENT 4-0 FS-2 18 IN ETHILON 662H

## (undated) DEVICE — SPONGE GZ W4XL4IN COT 12 PLY TYP VII WVN C FLD DSGN

## (undated) DEVICE — BANDAGE,GAUZE,4.5"X4.1YD,STERILE,LF: Brand: MEDLINE

## (undated) DEVICE — SOLUTION IV IRRIG POUR BRL 0.9% SODIUM CHL 2F7124

## (undated) DEVICE — 2.0MM PROVISIONAL FIXATION PIN - 14MM: Brand: EVOS

## (undated) DEVICE — ZIP 16 SURGICAL SKIN CLOSURE DEVICE: Brand: ZIP 16 SURGICAL SKIN CLOSURE DEVICE

## (undated) DEVICE — PENCIL SMK EVAC ALL IN 1 DSGN ENH VISIBILITY IMPROVED AIR

## (undated) DEVICE — DRAPE,EXTREMITY,89X128,STERILE: Brand: MEDLINE

## (undated) DEVICE — 2.0MM PROVISIONAL FIXATION PIN - 25MM: Brand: EVOS

## (undated) DEVICE — BAG,BANDED,W/RUBBERBAND,STERILE,30X36: Brand: MEDLINE

## (undated) DEVICE — PACK-MAJOR

## (undated) DEVICE — C-ARMOR C-ARM EQUIPMENT COVERS CLEAR STERILE UNIVERSAL FIT 12 PER CASE: Brand: C-ARMOR

## (undated) DEVICE — SUTURE VCRL SZ 2-0 L27IN ABSRB UD L26MM SH 1/2 CIR J417H

## (undated) DEVICE — GOWN,SIRUS,NONRNF,SETINSLV,XL,20/CS: Brand: MEDLINE

## (undated) DEVICE — PACK PROCEDURE SURG SET UP SRMC

## (undated) DEVICE — 4.0MM CANNULATED CORTEX REAMER                                    F/4.0MM CANNULATED SCREWS: Brand: CANNULATED SCREWS

## (undated) DEVICE — 2.0MM DRILL LONG WITH AO QUICK CONNECT: Brand: EVOS

## (undated) DEVICE — GLOVE ORANGE PI 8 1/2   MSG9085

## (undated) DEVICE — 2.7MM CANNULATED REAMER F/ 4.0MM                                    CANNULATED SCREWS: Brand: CANNULATED SCREWS